# Patient Record
Sex: FEMALE | Race: WHITE | NOT HISPANIC OR LATINO
[De-identification: names, ages, dates, MRNs, and addresses within clinical notes are randomized per-mention and may not be internally consistent; named-entity substitution may affect disease eponyms.]

---

## 2017-01-12 ENCOUNTER — APPOINTMENT (OUTPATIENT)
Dept: INTERNAL MEDICINE | Facility: CLINIC | Age: 82
End: 2017-01-12

## 2017-01-14 ENCOUNTER — MEDICATION RENEWAL (OUTPATIENT)
Age: 82
End: 2017-01-14

## 2017-01-15 ENCOUNTER — RX RENEWAL (OUTPATIENT)
Age: 82
End: 2017-01-15

## 2017-01-18 ENCOUNTER — RX RENEWAL (OUTPATIENT)
Age: 82
End: 2017-01-18

## 2017-02-10 ENCOUNTER — APPOINTMENT (OUTPATIENT)
Dept: CARDIOLOGY | Facility: CLINIC | Age: 82
End: 2017-02-10

## 2017-03-10 ENCOUNTER — APPOINTMENT (OUTPATIENT)
Dept: CARDIOLOGY | Facility: CLINIC | Age: 82
End: 2017-03-10

## 2017-03-21 ENCOUNTER — EMERGENCY (EMERGENCY)
Facility: HOSPITAL | Age: 82
LOS: 1 days | Discharge: HOME | End: 2017-03-21
Attending: EMERGENCY MEDICINE | Admitting: EMERGENCY MEDICINE
Payer: MEDICARE

## 2017-03-21 VITALS
SYSTOLIC BLOOD PRESSURE: 129 MMHG | RESPIRATION RATE: 20 BRPM | HEART RATE: 65 BPM | TEMPERATURE: 97 F | DIASTOLIC BLOOD PRESSURE: 70 MMHG | OXYGEN SATURATION: 97 % | HEIGHT: 66 IN

## 2017-03-21 VITALS
DIASTOLIC BLOOD PRESSURE: 71 MMHG | TEMPERATURE: 98 F | HEART RATE: 60 BPM | RESPIRATION RATE: 16 BRPM | SYSTOLIC BLOOD PRESSURE: 117 MMHG | OXYGEN SATURATION: 95 %

## 2017-03-21 DIAGNOSIS — M79.651 PAIN IN RIGHT THIGH: ICD-10-CM

## 2017-03-21 PROCEDURE — 80053 COMPREHEN METABOLIC PANEL: CPT

## 2017-03-21 PROCEDURE — 93971 EXTREMITY STUDY: CPT | Mod: 26

## 2017-03-21 PROCEDURE — 85610 PROTHROMBIN TIME: CPT

## 2017-03-21 PROCEDURE — 99284 EMERGENCY DEPT VISIT MOD MDM: CPT | Mod: 25

## 2017-03-21 PROCEDURE — 85730 THROMBOPLASTIN TIME PARTIAL: CPT

## 2017-03-21 PROCEDURE — 99284 EMERGENCY DEPT VISIT MOD MDM: CPT | Mod: GC

## 2017-03-21 PROCEDURE — 85027 COMPLETE CBC AUTOMATED: CPT

## 2017-03-21 PROCEDURE — 93971 EXTREMITY STUDY: CPT

## 2017-03-21 NOTE — ED PROVIDER NOTE - ATTENDING CONTRIBUTION TO CARE
Nemes - 89yo F w hx of prior DVT in the ER for pain and erythema to R thigh for 3 days. No fevers/chills/dizziness. N o trauma. No CP/SOB. Induration, erythema and TTP,  tortuous, localized over superficial varicose veins. No warmth. Likely superficial thrombophlebitis, low suspicion for DVT/ cellulitis. Will get labs, DVT study, treat, likely DC w antiinflammatories, warm compresses and elevation

## 2017-03-21 NOTE — ED PROVIDER NOTE - PLAN OF CARE
1) Please follow-up with your Primary Medical Doctor in 3-5 days. If you need to find a new physician, please call [phone].  2) Return to the Emergency Department if you experiences: fevers, chills, worsening rash, swellign, pain, or symptoms that are new or recurrent.  3) If you have any questions or concerns, do not hesitate to contact us at [phone].  4) To alleviate the pain, please rest and take Tylenol 650 mg or Motrin 400 mg once every 6 hours as needed.

## 2017-03-21 NOTE — ED PROVIDER NOTE - OBJECTIVE STATEMENT
RLE mass on the thigh that started 3 days ago, painful to touch, swelling around the vein, started to have redness around the area.  No trauma or falls, bug bites, cuts.   Pt denies any concern for dizziness, and is not dizzy today. Has been dizzy in the past when standing up from standing.     PMD: Herb Cortes

## 2017-03-21 NOTE — ED ADULT NURSE NOTE - PMH
CAD (coronary artery disease)    Depression    DVT (deep venous thrombosis)    High cholesterol    Myocardial infarction  2005

## 2017-03-21 NOTE — ED PROVIDER NOTE - CARE PLAN
Principal Discharge DX:	Thrombophlebitis  Instructions for follow-up, activity and diet:	1) Please follow-up with your Primary Medical Doctor in 3-5 days. If you need to find a new physician, please call [phone].  2) Return to the Emergency Department if you experiences: fevers, chills, worsening rash, swellign, pain, or symptoms that are new or recurrent.  3) If you have any questions or concerns, do not hesitate to contact us at [phone].  4) To alleviate the pain, please rest and take Tylenol 650 mg or Motrin 400 mg once every 6 hours as needed. Principal Discharge DX:	Thrombophlebitis  Instructions for follow-up, activity and diet:	1) Please follow-up with your Primary Medical Doctor in 3-5 days. If you need to find a new physician, please call [phone].  2) Return to the Emergency Department if you experiences: fevers, chills, worsening rash, swellign, pain, or symptoms that are new or recurrent.  3) If you have any questions or concerns, do not hesitate to contact us at [phone].  4) To alleviate the pain, please rest and take Tylenol 650 mg or Motrin 400 mg once every 6 hours as needed.

## 2017-03-21 NOTE — ED PROVIDER NOTE - PHYSICAL EXAMINATION
Physical Exam: elderly F in NAD, AAOx3, NCAT, MMM, CTAB, normal rate and regular rhythm, abdomen is soft and NTND, No edema, No deformity of extremities,  RLE w/ thrombophlebitis with mild erythema of the skin around thrombosis of veins, CN grossly intact, No focal motor or sensory deficits. ~ Saud Noe MD

## 2017-03-21 NOTE — ED ADULT NURSE NOTE - OBJECTIVE STATEMENT
89 year old female patient presents to ED with swollen 89 year old female patient presents to ED with swelling and redness to RLE veins x 3 days. Pt reports worsening pain upon palpation. Pt reports previous hx of DVT to LLE years ago, but states she did not have pain with DVT. Denies chest pain, SOB, abd pain, n/v/d, fevers, chills. Daughter at bedside concerned for dizziness, but patient states shes been having dizzy spells for 20+ years and is not concerned. VSS.

## 2017-03-21 NOTE — ED PROVIDER NOTE - NS ED ROS FT
No fever, no chills, no change in vision, no change in hearing, no chest pain, no shortness of breath, no abdominal pain, no vomiting, no dysuria, + RLE muscle pain, + rashes, no loss of consciousness. ~ Saud Noe MD

## 2017-03-30 ENCOUNTER — RX RENEWAL (OUTPATIENT)
Age: 82
End: 2017-03-30

## 2017-04-17 ENCOUNTER — RX RENEWAL (OUTPATIENT)
Age: 82
End: 2017-04-17

## 2017-07-05 ENCOUNTER — APPOINTMENT (OUTPATIENT)
Dept: CARDIOLOGY | Facility: CLINIC | Age: 82
End: 2017-07-05

## 2017-07-11 ENCOUNTER — RX RENEWAL (OUTPATIENT)
Age: 82
End: 2017-07-11

## 2017-07-21 ENCOUNTER — RX RENEWAL (OUTPATIENT)
Age: 82
End: 2017-07-21

## 2017-07-26 ENCOUNTER — NON-APPOINTMENT (OUTPATIENT)
Age: 82
End: 2017-07-26

## 2017-07-26 ENCOUNTER — APPOINTMENT (OUTPATIENT)
Dept: CARDIOLOGY | Facility: CLINIC | Age: 82
End: 2017-07-26

## 2017-07-26 VITALS
DIASTOLIC BLOOD PRESSURE: 58 MMHG | RESPIRATION RATE: 16 BRPM | BODY MASS INDEX: 25.61 KG/M2 | HEIGHT: 64 IN | OXYGEN SATURATION: 95 % | HEART RATE: 75 BPM | WEIGHT: 150 LBS | SYSTOLIC BLOOD PRESSURE: 96 MMHG

## 2017-07-27 LAB
ALBUMIN SERPL ELPH-MCNC: 4.4 G/DL
ALP BLD-CCNC: 114 U/L
ALT SERPL-CCNC: 23 U/L
ANION GAP SERPL CALC-SCNC: 17 MMOL/L
APPEARANCE: ABNORMAL
AST SERPL-CCNC: 26 U/L
BACTERIA: ABNORMAL
BASOPHILS # BLD AUTO: 0.03 K/UL
BASOPHILS NFR BLD AUTO: 0.4 %
BILIRUB SERPL-MCNC: 1.1 MG/DL
BILIRUBIN URINE: ABNORMAL
BLOOD URINE: NEGATIVE
BUN SERPL-MCNC: 30 MG/DL
CALCIUM SERPL-MCNC: 9.3 MG/DL
CHLORIDE SERPL-SCNC: 104 MMOL/L
CHOLEST SERPL-MCNC: 182 MG/DL
CHOLEST/HDLC SERPL: 3 RATIO
CO2 SERPL-SCNC: 25 MMOL/L
COLOR: ABNORMAL
CREAT SERPL-MCNC: 1.56 MG/DL
EOSINOPHIL # BLD AUTO: 0.23 K/UL
EOSINOPHIL NFR BLD AUTO: 3.2 %
GLUCOSE QUALITATIVE U: NORMAL MG/DL
GLUCOSE SERPL-MCNC: 116 MG/DL
GRANULAR CASTS: 1 /LPF
HCT VFR BLD CALC: 41.7 %
HDLC SERPL-MCNC: 61 MG/DL
HGB BLD-MCNC: 13.4 G/DL
HYALINE CASTS: 2 /LPF
IMM GRANULOCYTES NFR BLD AUTO: 0.1 %
KETONES URINE: ABNORMAL
LDLC SERPL CALC-MCNC: 96 MG/DL
LDLC SERPL DIRECT ASSAY-MCNC: 106 MG/DL
LEUKOCYTE ESTERASE URINE: ABNORMAL
LYMPHOCYTES # BLD AUTO: 1.38 K/UL
LYMPHOCYTES NFR BLD AUTO: 19 %
MAN DIFF?: NORMAL
MCHC RBC-ENTMCNC: 31.1 PG
MCHC RBC-ENTMCNC: 32.1 GM/DL
MCV RBC AUTO: 96.8 FL
MICROSCOPIC-UA: NORMAL
MONOCYTES # BLD AUTO: 0.63 K/UL
MONOCYTES NFR BLD AUTO: 8.7 %
NEUTROPHILS # BLD AUTO: 4.99 K/UL
NEUTROPHILS NFR BLD AUTO: 68.6 %
NITRITE URINE: NEGATIVE
PH URINE: 5
PLATELET # BLD AUTO: 181 K/UL
POTASSIUM SERPL-SCNC: 4.7 MMOL/L
PROT SERPL-MCNC: 7.2 G/DL
PROTEIN URINE: 100 MG/DL
RBC # BLD: 4.31 M/UL
RBC # FLD: 13.9 %
RED BLOOD CELLS URINE: 4 /HPF
SODIUM SERPL-SCNC: 146 MMOL/L
SPECIFIC GRAVITY URINE: 1.02
SQUAMOUS EPITHELIAL CELLS: 10 /HPF
TRIGL SERPL-MCNC: 126 MG/DL
URINE COMMENTS: NORMAL
UROBILINOGEN URINE: 1 MG/DL
WBC # FLD AUTO: 7.27 K/UL
WHITE BLOOD CELLS URINE: 36 /HPF

## 2017-09-15 ENCOUNTER — APPOINTMENT (OUTPATIENT)
Dept: CV DIAGNOSITCS | Facility: HOSPITAL | Age: 82
End: 2017-09-15

## 2017-10-19 ENCOUNTER — RX RENEWAL (OUTPATIENT)
Age: 82
End: 2017-10-19

## 2017-11-02 ENCOUNTER — APPOINTMENT (OUTPATIENT)
Dept: ELECTROPHYSIOLOGY | Facility: CLINIC | Age: 82
End: 2017-11-02

## 2017-11-03 ENCOUNTER — APPOINTMENT (OUTPATIENT)
Dept: CARDIOLOGY | Facility: CLINIC | Age: 82
End: 2017-11-03

## 2017-11-15 ENCOUNTER — APPOINTMENT (OUTPATIENT)
Dept: CARDIOLOGY | Facility: CLINIC | Age: 82
End: 2017-11-15
Payer: MEDICARE

## 2017-11-15 ENCOUNTER — NON-APPOINTMENT (OUTPATIENT)
Age: 82
End: 2017-11-15

## 2017-11-15 VITALS
HEART RATE: 75 BPM | BODY MASS INDEX: 25.58 KG/M2 | DIASTOLIC BLOOD PRESSURE: 68 MMHG | OXYGEN SATURATION: 94 % | WEIGHT: 149 LBS | SYSTOLIC BLOOD PRESSURE: 122 MMHG

## 2017-11-15 DIAGNOSIS — I35.0 NONRHEUMATIC AORTIC (VALVE) STENOSIS: ICD-10-CM

## 2017-11-15 DIAGNOSIS — I50.9 HEART FAILURE, UNSPECIFIED: ICD-10-CM

## 2017-11-15 DIAGNOSIS — E78.5 HYPERLIPIDEMIA, UNSPECIFIED: ICD-10-CM

## 2017-11-15 DIAGNOSIS — I25.10 ATHEROSCLEROTIC HEART DISEASE OF NATIVE CORONARY ARTERY W/OUT ANGINA PECTORIS: ICD-10-CM

## 2017-11-15 DIAGNOSIS — Z86.79 PERSONAL HISTORY OF OTHER DISEASES OF THE CIRCULATORY SYSTEM: ICD-10-CM

## 2017-11-15 PROCEDURE — 90662 IIV NO PRSV INCREASED AG IM: CPT

## 2017-11-15 PROCEDURE — 93000 ELECTROCARDIOGRAM COMPLETE: CPT

## 2017-11-15 PROCEDURE — 93306 TTE W/DOPPLER COMPLETE: CPT

## 2017-11-15 PROCEDURE — G0008: CPT

## 2017-11-15 PROCEDURE — 99215 OFFICE O/P EST HI 40 MIN: CPT

## 2017-12-04 ENCOUNTER — RX RENEWAL (OUTPATIENT)
Age: 82
End: 2017-12-04

## 2017-12-13 ENCOUNTER — APPOINTMENT (OUTPATIENT)
Dept: CARDIOLOGY | Facility: CLINIC | Age: 82
End: 2017-12-13

## 2017-12-28 ENCOUNTER — APPOINTMENT (OUTPATIENT)
Dept: ELECTROPHYSIOLOGY | Facility: CLINIC | Age: 82
End: 2017-12-28

## 2018-01-22 ENCOUNTER — RX RENEWAL (OUTPATIENT)
Age: 83
End: 2018-01-22

## 2018-03-25 ENCOUNTER — RX RENEWAL (OUTPATIENT)
Age: 83
End: 2018-03-25

## 2018-04-10 ENCOUNTER — APPOINTMENT (OUTPATIENT)
Dept: CARDIOLOGY | Facility: CLINIC | Age: 83
End: 2018-04-10

## 2018-05-09 ENCOUNTER — INPATIENT (INPATIENT)
Facility: HOSPITAL | Age: 83
LOS: 11 days | Discharge: HOME | DRG: 175 | End: 2018-05-21
Attending: HOSPITALIST | Admitting: HOSPITALIST
Payer: MEDICARE

## 2018-05-09 VITALS
RESPIRATION RATE: 16 BRPM | OXYGEN SATURATION: 97 % | DIASTOLIC BLOOD PRESSURE: 56 MMHG | SYSTOLIC BLOOD PRESSURE: 97 MMHG | HEART RATE: 74 BPM

## 2018-05-09 DIAGNOSIS — I26.99 OTHER PULMONARY EMBOLISM WITHOUT ACUTE COR PULMONALE: ICD-10-CM

## 2018-05-09 LAB
ALBUMIN SERPL ELPH-MCNC: 3.5 G/DL — SIGNIFICANT CHANGE UP (ref 3.3–5)
ALP SERPL-CCNC: 84 U/L — SIGNIFICANT CHANGE UP (ref 40–120)
ALT FLD-CCNC: 13 U/L — SIGNIFICANT CHANGE UP (ref 10–45)
ANION GAP SERPL CALC-SCNC: 14 MMOL/L — SIGNIFICANT CHANGE UP (ref 5–17)
APPEARANCE UR: ABNORMAL
APTT BLD: 37.7 SEC — HIGH (ref 27.5–37.4)
AST SERPL-CCNC: 16 U/L — SIGNIFICANT CHANGE UP (ref 10–40)
BASE EXCESS BLDV CALC-SCNC: 3.3 MMOL/L — HIGH (ref -2–2)
BASOPHILS # BLD AUTO: 0 K/UL — SIGNIFICANT CHANGE UP (ref 0–0.2)
BASOPHILS NFR BLD AUTO: 0.3 % — SIGNIFICANT CHANGE UP (ref 0–2)
BILIRUB SERPL-MCNC: 2.1 MG/DL — HIGH (ref 0.2–1.2)
BILIRUB UR-MCNC: NEGATIVE — SIGNIFICANT CHANGE UP
BUN SERPL-MCNC: 25 MG/DL — HIGH (ref 7–23)
CA-I SERPL-SCNC: 1.16 MMOL/L — SIGNIFICANT CHANGE UP (ref 1.12–1.3)
CALCIUM SERPL-MCNC: 8.7 MG/DL — SIGNIFICANT CHANGE UP (ref 8.4–10.5)
CHLORIDE BLDV-SCNC: 107 MMOL/L — SIGNIFICANT CHANGE UP (ref 96–108)
CHLORIDE SERPL-SCNC: 102 MMOL/L — SIGNIFICANT CHANGE UP (ref 96–108)
CO2 BLDV-SCNC: 30 MMOL/L — SIGNIFICANT CHANGE UP (ref 22–30)
CO2 SERPL-SCNC: 26 MMOL/L — SIGNIFICANT CHANGE UP (ref 22–31)
COLOR SPEC: ABNORMAL
CREAT SERPL-MCNC: 1.15 MG/DL — SIGNIFICANT CHANGE UP (ref 0.5–1.3)
DIFF PNL FLD: NEGATIVE — SIGNIFICANT CHANGE UP
EOSINOPHIL # BLD AUTO: 0 K/UL — SIGNIFICANT CHANGE UP (ref 0–0.5)
EOSINOPHIL NFR BLD AUTO: 0.2 % — SIGNIFICANT CHANGE UP (ref 0–6)
EPI CELLS # UR: SIGNIFICANT CHANGE UP /HPF
GAS PNL BLDV: 140 MMOL/L — SIGNIFICANT CHANGE UP (ref 136–145)
GAS PNL BLDV: SIGNIFICANT CHANGE UP
GAS PNL BLDV: SIGNIFICANT CHANGE UP
GLUCOSE BLDV-MCNC: 164 MG/DL — HIGH (ref 70–99)
GLUCOSE SERPL-MCNC: 152 MG/DL — HIGH (ref 70–99)
GLUCOSE UR QL: NEGATIVE — SIGNIFICANT CHANGE UP
HCO3 BLDV-SCNC: 28 MMOL/L — SIGNIFICANT CHANGE UP (ref 21–29)
HCT VFR BLD CALC: 40.9 % — SIGNIFICANT CHANGE UP (ref 34.5–45)
HCT VFR BLDA CALC: 40 % — SIGNIFICANT CHANGE UP (ref 39–50)
HGB BLD CALC-MCNC: 13.1 G/DL — SIGNIFICANT CHANGE UP (ref 11.5–15.5)
HGB BLD-MCNC: 13.4 G/DL — SIGNIFICANT CHANGE UP (ref 11.5–15.5)
INR BLD: 1.29 RATIO — HIGH (ref 0.88–1.16)
KETONES UR-MCNC: NEGATIVE — SIGNIFICANT CHANGE UP
LACTATE BLDV-MCNC: 2.4 MMOL/L — HIGH (ref 0.7–2)
LEUKOCYTE ESTERASE UR-ACNC: ABNORMAL
LYMPHOCYTES # BLD AUTO: 0.7 K/UL — LOW (ref 1–3.3)
LYMPHOCYTES # BLD AUTO: 6.9 % — LOW (ref 13–44)
MCHC RBC-ENTMCNC: 31.5 PG — SIGNIFICANT CHANGE UP (ref 27–34)
MCHC RBC-ENTMCNC: 32.8 GM/DL — SIGNIFICANT CHANGE UP (ref 32–36)
MCV RBC AUTO: 96 FL — SIGNIFICANT CHANGE UP (ref 80–100)
MONOCYTES # BLD AUTO: 0.6 K/UL — SIGNIFICANT CHANGE UP (ref 0–0.9)
MONOCYTES NFR BLD AUTO: 6.8 % — SIGNIFICANT CHANGE UP (ref 2–14)
NEUTROPHILS # BLD AUTO: 8.1 K/UL — HIGH (ref 1.8–7.4)
NEUTROPHILS NFR BLD AUTO: 85.8 % — HIGH (ref 43–77)
NITRITE UR-MCNC: NEGATIVE — SIGNIFICANT CHANGE UP
NT-PROBNP SERPL-SCNC: HIGH PG/ML (ref 0–300)
PCO2 BLDV: 47 MMHG — SIGNIFICANT CHANGE UP (ref 35–50)
PH BLDV: 7.4 — SIGNIFICANT CHANGE UP (ref 7.35–7.45)
PH UR: 6 — SIGNIFICANT CHANGE UP (ref 5–8)
PLATELET # BLD AUTO: 123 K/UL — LOW (ref 150–400)
PO2 BLDV: 16 MMHG — LOW (ref 25–45)
POTASSIUM BLDV-SCNC: 4.6 MMOL/L — SIGNIFICANT CHANGE UP (ref 3.5–5)
POTASSIUM SERPL-MCNC: 4.6 MMOL/L — SIGNIFICANT CHANGE UP (ref 3.5–5.3)
POTASSIUM SERPL-SCNC: 4.6 MMOL/L — SIGNIFICANT CHANGE UP (ref 3.5–5.3)
PROT SERPL-MCNC: 7 G/DL — SIGNIFICANT CHANGE UP (ref 6–8.3)
PROT UR-MCNC: 30 MG/DL
PROTHROM AB SERPL-ACNC: 14 SEC — HIGH (ref 9.8–12.7)
RBC # BLD: 4.26 M/UL — SIGNIFICANT CHANGE UP (ref 3.8–5.2)
RBC # FLD: 13.2 % — SIGNIFICANT CHANGE UP (ref 10.3–14.5)
RBC CASTS # UR COMP ASSIST: SIGNIFICANT CHANGE UP /HPF (ref 0–2)
SAO2 % BLDV: 21 % — LOW (ref 67–88)
SODIUM SERPL-SCNC: 142 MMOL/L — SIGNIFICANT CHANGE UP (ref 135–145)
SP GR SPEC: >1.03 — HIGH (ref 1.01–1.02)
TROPONIN T SERPL-MCNC: 0.04 NG/ML — SIGNIFICANT CHANGE UP (ref 0–0.06)
UROBILINOGEN FLD QL: 2 MG/DL
WBC # BLD: 9.5 K/UL — SIGNIFICANT CHANGE UP (ref 3.8–10.5)
WBC # FLD AUTO: 9.5 K/UL — SIGNIFICANT CHANGE UP (ref 3.8–10.5)
WBC UR QL: >50 /HPF (ref 0–5)

## 2018-05-09 PROCEDURE — 93010 ELECTROCARDIOGRAM REPORT: CPT

## 2018-05-09 PROCEDURE — 99291 CRITICAL CARE FIRST HOUR: CPT | Mod: GC

## 2018-05-09 PROCEDURE — 71045 X-RAY EXAM CHEST 1 VIEW: CPT | Mod: 26

## 2018-05-09 PROCEDURE — 71275 CT ANGIOGRAPHY CHEST: CPT | Mod: 26

## 2018-05-09 PROCEDURE — 99223 1ST HOSP IP/OBS HIGH 75: CPT | Mod: GC

## 2018-05-09 RX ORDER — HEPARIN SODIUM 5000 [USP'U]/ML
INJECTION INTRAVENOUS; SUBCUTANEOUS
Qty: 25000 | Refills: 0 | Status: DISCONTINUED | OUTPATIENT
Start: 2018-05-09 | End: 2018-05-18

## 2018-05-09 RX ORDER — SODIUM CHLORIDE 9 MG/ML
1000 INJECTION INTRAMUSCULAR; INTRAVENOUS; SUBCUTANEOUS
Qty: 0 | Refills: 0 | Status: DISCONTINUED | OUTPATIENT
Start: 2018-05-09 | End: 2018-05-10

## 2018-05-09 RX ORDER — HEPARIN SODIUM 5000 [USP'U]/ML
5000 INJECTION INTRAVENOUS; SUBCUTANEOUS ONCE
Qty: 0 | Refills: 0 | Status: COMPLETED | OUTPATIENT
Start: 2018-05-09 | End: 2018-05-09

## 2018-05-09 RX ORDER — HEPARIN SODIUM 5000 [USP'U]/ML
2500 INJECTION INTRAVENOUS; SUBCUTANEOUS EVERY 6 HOURS
Qty: 0 | Refills: 0 | Status: DISCONTINUED | OUTPATIENT
Start: 2018-05-09 | End: 2018-05-18

## 2018-05-09 RX ORDER — HEPARIN SODIUM 5000 [USP'U]/ML
5000 INJECTION INTRAVENOUS; SUBCUTANEOUS EVERY 6 HOURS
Qty: 0 | Refills: 0 | Status: DISCONTINUED | OUTPATIENT
Start: 2018-05-09 | End: 2018-05-18

## 2018-05-09 RX ORDER — CEFTRIAXONE 500 MG/1
1 INJECTION, POWDER, FOR SOLUTION INTRAMUSCULAR; INTRAVENOUS ONCE
Qty: 0 | Refills: 0 | Status: COMPLETED | OUTPATIENT
Start: 2018-05-09 | End: 2018-05-10

## 2018-05-09 RX ADMIN — HEPARIN SODIUM 1100 UNIT(S)/HR: 5000 INJECTION INTRAVENOUS; SUBCUTANEOUS at 23:04

## 2018-05-09 RX ADMIN — HEPARIN SODIUM 5000 UNIT(S): 5000 INJECTION INTRAVENOUS; SUBCUTANEOUS at 23:03

## 2018-05-09 RX ADMIN — SODIUM CHLORIDE 100 MILLILITER(S): 9 INJECTION INTRAMUSCULAR; INTRAVENOUS; SUBCUTANEOUS at 20:36

## 2018-05-09 NOTE — ED ADULT NURSE NOTE - OBJECTIVE STATEMENT
89 y.o F presents to the ED via EMS for difficulty breathing. Patient is awake, alert and speaking coherently. Patient states that recently she has been having episodes of difficulty breathing and SOB. Additionally patient reports decreased PO intake over the past week. As per patient's son, he reports that the patient has some dementia and she has been non-compliant with her medications- he was unaware until this week. Patient presents A&Ox3, afebrile and ambulatory with assistance (weak); denies headache and dizziness, denies chest pain, denies cough and SOB at this time -RR 18 and SPO2 95% on RA. Cardiac monitoring initiated- paced rhythm noted on the monitor. 89 y.o F presents to the ED via EMS for difficulty breathing. Patient is awake, alert and speaking coherently. Patient states that recently she has been having episodes of difficulty breathing and SOB. Additionally patient reports decreased PO intake over the past week. As per patient's son, he reports that the patient has some dementia and she has been non-compliant with her medications- he was unaware until this week. Patient presents A&Ox3, afebrile and ambulatory with assistance (weak); denies headache and dizziness, denies chest pain, denies cough and SOB at this time -RR 18 and SPO2 95% on RA, lungs clear. Cardiac monitoring initiated- paced rhythm noted on the monitor.

## 2018-05-09 NOTE — ED PROVIDER NOTE - ATTENDING CONTRIBUTION TO CARE
89 yof pmhx cad w stent in 2005, re-cathed few yrs ago with no instent stenosis or other lesions found. at that time pt also was vol overloaded requiring diuresis. also has hx of complete heart block w pacer placed. hx of prior dvt on coumadin which has now finished. presents today with gradual onset shortness of breath, generalized weakness, sleeping more, eating less, feeling depressed. this afternoon had episode of left sided substernal/ below left breast chest pain and was feeling "more shaky" than usual. no increased leg swelling, has been staying in bed more than usual recently.   cards - [facility].     ROS:   constitutional - no fever, no chills, + weakness   eyes - no visual changes, no redness  eent - no sore throat, no nasal congestion  cvs - + chest pain, no leg swelling  resp - no shortness of breath, no cough  gi - no abdominal pain, no vomiting, no diarrhea  gu - no dysuria, no hematuria  msk - no acute back pain, no joint swelling  skin - no rashes, no jaundice  neuro - no headache, no focal weakness  psych - no acute mental health issue     Physical Exam:   constitutional - well appearing, awake and alert, oriented x3, satting low 90s on ra, bp mildly soft.   head - no external evidence of trauma  cvs - rrr, no murmurs, no peripheral edema, no calf tenderness  resp - breath sounds clear and equal bilat  gi - abdomen soft and nontender, no rigidity, guarding or rebound, bowel sounds present  msk - moving all extremities spontaneously  neuro - alert and oriented x3, no focal deficits, CNs 2-12 grossly intact  skin- no jaundice, warm and dry  psych - mood and affect wnl, no apparent risk to self or others     ? acs in setting of known cad w stents, vs pe (mild hypoxia, prior dvt off ac) vs chf exac (sob, mildy hypoxic though her lungs sound clear, does not appear clinically volume overloaded at this time). will do blood work, plan for hospital admission for further w/u. SARIAH Brown MD 89 yof pmhx cad w stent in 2005, re-cathed few yrs ago with no instent stenosis or other lesions found. at that time pt also was vol overloaded requiring diuresis. also has hx of complete heart block w pacer placed. hx of prior dvt on coumadin which has now finished. presents today with gradual onset shortness of breath, generalized weakness, sleeping more, eating less, feeling depressed. this afternoon had episode of left sided substernal/ below left breast chest pain and was feeling "more shaky" than usual. no increased leg swelling, has been staying in bed more than usual recently.   [facility].     ROS:   constitutional - no fever, no chills, + weakness   eyes - no visual changes, no redness  eent - no sore throat, no nasal congestion  cvs - + chest pain, no leg swelling  resp - no shortness of breath, no cough  gi - no abdominal pain, no vomiting, no diarrhea  gu - no dysuria, no hematuria  msk - no acute back pain, no joint swelling  skin - no rashes, no jaundice  neuro - no headache, no focal weakness  psych - no acute mental health issue     Physical Exam:   constitutional - well appearing, awake and alert, oriented x3, satting low 90s on ra, bp mildly soft.   head - no external evidence of trauma  cvs - rrr, no murmurs, no peripheral edema, no calf tenderness  resp - breath sounds clear and equal bilat  gi - abdomen soft and nontender, no rigidity, guarding or rebound, bowel sounds present  msk - moving all extremities spontaneously  neuro - alert and oriented x3, no focal deficits, CNs 2-12 grossly intact  skin- no jaundice, warm and dry  psych - mood and affect wnl, no apparent risk to self or others     ? acs in setting of known cad w stents, vs pe (mild hypoxia, prior dvt off ac) vs chf exac (sob, mildy hypoxic though her lungs sound clear, does not appear clinically volume overloaded at this time). will do blood work, plan for hospital admission for further w/u. pt found to have markedly elev pro bnp c/w poss chf exac - cta chest w few segmental pe's without signs of rv strain however pt w mild hypotension and elev bnp - pert team called who did not feel pt candidate for interventional therapies; started heparin gtt, bedside echo done by cards w/o signif rv strain. will admit for further eval.  SARIAH Brown MD

## 2018-05-09 NOTE — ED PROVIDER NOTE - MEDICAL DECISION MAKING DETAILS
Saud Noe MD (resident): HI and L lower chest pain. will get EKG, CXR, cardiac work-up. pt w/ mild hypoxia to 95% but no acute resp distress.

## 2018-05-09 NOTE — ED PROVIDER NOTE - PHYSICAL EXAMINATION
Physical Exam: elderly F who is in NAD, AAOx3, NCAT, MMM, neck is supple, PERRL, CTAB without rales or ronchi, normal rate and regular rhythm, abdomen is soft and NTND, No edema, No deformity of extremities, No rashes, CN grossly intact, No focal motor or sensory deficits. ~ Saud Noe MD

## 2018-05-09 NOTE — ED PROVIDER NOTE - NS ED ROS FT
No fever, no chills, no change in vision, no change in hearing, + chest pain, + shortness of breath, no abdominal pain, no vomiting, no dysuria, no muscle pain, no rashes, no loss of consciousness. ~ Saud Noe MD

## 2018-05-09 NOTE — ED PROVIDER NOTE - CRITICAL CARE PROVIDED
interpretation of diagnostic studies/consultation with other physicians/direct patient care (not related to procedure)/documentation/additional history taking direct patient care (not related to procedure)/documentation/interpretation of diagnostic studies/consult w/ pt's family directly relating to pts condition/consultation with other physicians/additional history taking

## 2018-05-09 NOTE — ED PROVIDER NOTE - PROGRESS NOTE DETAILS
Saud Noe MD (resident): cards fellow will come to assess pt for PERT. discussed w/ Dr. Porras, accepted to tele.

## 2018-05-09 NOTE — ED PROVIDER NOTE - OBJECTIVE STATEMENT
Saud Noe MD (resident): 89 F w/ Hx of dementia, CAD, MI, DVT, who was BIBEMS for SOB. Pt has no complaints and does not know why she is in the hospital. However, family reports that pt has been complaining of SOB and has been noncompliant with her medications. EMS noted SpO2 to 94%. Saud Noe MD (resident): 89 F w/ Hx of dementia, CAD, MI, DVT no longer on A/C, who was BIBEMS for SOB. Family reports that pt has been complaining of SOB and has been noncompliant with her medications. EMS noted SpO2 to 94%. Patient reports SOB that is worse w/ exertion over the last 2 days, cannot walk up the stairs of her home any longer. Pt also with intermittent L lower chest pain that is sharp in nature, nonpleuritic, nonexertional. Pt lives alone, no home aides. Pt on  mg, lasix 40 mg.

## 2018-05-09 NOTE — ED ADULT NURSE NOTE - CHPI ED SYMPTOMS NEG
no headache/no hemoptysis/no wheezing/no diaphoresis/no body aches/no chest pain/no cough/no chills/no edema/no fever

## 2018-05-09 NOTE — ED PROVIDER NOTE - CARE PLAN
Principal Discharge DX:	Other acute pulmonary embolism without acute cor pulmonale  Secondary Diagnosis:	Elevated troponin Principal Discharge DX:	Other acute pulmonary embolism without acute cor pulmonale  Secondary Diagnosis:	UTI (urinary tract infection)  Secondary Diagnosis:	Chronic systolic heart failure

## 2018-05-10 DIAGNOSIS — I50.22 CHRONIC SYSTOLIC (CONGESTIVE) HEART FAILURE: ICD-10-CM

## 2018-05-10 DIAGNOSIS — R91.8 OTHER NONSPECIFIC ABNORMAL FINDING OF LUNG FIELD: ICD-10-CM

## 2018-05-10 DIAGNOSIS — I25.10 ATHEROSCLEROTIC HEART DISEASE OF NATIVE CORONARY ARTERY WITHOUT ANGINA PECTORIS: ICD-10-CM

## 2018-05-10 DIAGNOSIS — Z29.9 ENCOUNTER FOR PROPHYLACTIC MEASURES, UNSPECIFIED: ICD-10-CM

## 2018-05-10 DIAGNOSIS — N18.3 CHRONIC KIDNEY DISEASE, STAGE 3 (MODERATE): ICD-10-CM

## 2018-05-10 DIAGNOSIS — D69.6 THROMBOCYTOPENIA, UNSPECIFIED: ICD-10-CM

## 2018-05-10 DIAGNOSIS — I26.99 OTHER PULMONARY EMBOLISM WITHOUT ACUTE COR PULMONALE: ICD-10-CM

## 2018-05-10 LAB
ALBUMIN SERPL ELPH-MCNC: 3.8 G/DL — SIGNIFICANT CHANGE UP (ref 3.3–5)
ALP SERPL-CCNC: 87 U/L — SIGNIFICANT CHANGE UP (ref 40–120)
ALT FLD-CCNC: 13 U/L — SIGNIFICANT CHANGE UP (ref 10–45)
ANION GAP SERPL CALC-SCNC: 13 MMOL/L — SIGNIFICANT CHANGE UP (ref 5–17)
APTT BLD: 66.4 SEC — HIGH (ref 27.5–37.4)
APTT BLD: 71.5 SEC — HIGH (ref 27.5–37.4)
AST SERPL-CCNC: 15 U/L — SIGNIFICANT CHANGE UP (ref 10–40)
BASOPHILS # BLD AUTO: 0 K/UL — SIGNIFICANT CHANGE UP (ref 0–0.2)
BASOPHILS NFR BLD AUTO: 0.3 % — SIGNIFICANT CHANGE UP (ref 0–2)
BILIRUB SERPL-MCNC: 1.8 MG/DL — HIGH (ref 0.2–1.2)
BUN SERPL-MCNC: 25 MG/DL — HIGH (ref 7–23)
CALCIUM SERPL-MCNC: 8.9 MG/DL — SIGNIFICANT CHANGE UP (ref 8.4–10.5)
CHLORIDE SERPL-SCNC: 102 MMOL/L — SIGNIFICANT CHANGE UP (ref 96–108)
CK MB CFR SERPL CALC: 3 NG/ML — SIGNIFICANT CHANGE UP (ref 0–3.8)
CK SERPL-CCNC: 83 U/L — SIGNIFICANT CHANGE UP (ref 25–170)
CO2 SERPL-SCNC: 26 MMOL/L — SIGNIFICANT CHANGE UP (ref 22–31)
CREAT SERPL-MCNC: 1.16 MG/DL — SIGNIFICANT CHANGE UP (ref 0.5–1.3)
CULTURE RESULTS: SIGNIFICANT CHANGE UP
EOSINOPHIL # BLD AUTO: 0.1 K/UL — SIGNIFICANT CHANGE UP (ref 0–0.5)
EOSINOPHIL NFR BLD AUTO: 1.2 % — SIGNIFICANT CHANGE UP (ref 0–6)
GLUCOSE SERPL-MCNC: 121 MG/DL — HIGH (ref 70–99)
HCT VFR BLD CALC: 38.7 % — SIGNIFICANT CHANGE UP (ref 34.5–45)
HGB BLD-MCNC: 12.8 G/DL — SIGNIFICANT CHANGE UP (ref 11.5–15.5)
INR BLD: 1.33 RATIO — HIGH (ref 0.88–1.16)
LACTATE SERPL-SCNC: 1.1 MMOL/L — SIGNIFICANT CHANGE UP (ref 0.7–2)
LYMPHOCYTES # BLD AUTO: 0.9 K/UL — LOW (ref 1–3.3)
LYMPHOCYTES # BLD AUTO: 8.2 % — LOW (ref 13–44)
MCHC RBC-ENTMCNC: 31.9 PG — SIGNIFICANT CHANGE UP (ref 27–34)
MCHC RBC-ENTMCNC: 33.1 GM/DL — SIGNIFICANT CHANGE UP (ref 32–36)
MCV RBC AUTO: 96.4 FL — SIGNIFICANT CHANGE UP (ref 80–100)
MONOCYTES # BLD AUTO: 0.7 K/UL — SIGNIFICANT CHANGE UP (ref 0–0.9)
MONOCYTES NFR BLD AUTO: 6.7 % — SIGNIFICANT CHANGE UP (ref 2–14)
NEUTROPHILS # BLD AUTO: 9.2 K/UL — HIGH (ref 1.8–7.4)
NEUTROPHILS NFR BLD AUTO: 83.6 % — HIGH (ref 43–77)
PLATELET # BLD AUTO: 114 K/UL — LOW (ref 150–400)
POTASSIUM SERPL-MCNC: 3.9 MMOL/L — SIGNIFICANT CHANGE UP (ref 3.5–5.3)
POTASSIUM SERPL-SCNC: 3.9 MMOL/L — SIGNIFICANT CHANGE UP (ref 3.5–5.3)
PROCALCITONIN SERPL-MCNC: 0.16 NG/ML — HIGH (ref 0–0.04)
PROT SERPL-MCNC: 7.1 G/DL — SIGNIFICANT CHANGE UP (ref 6–8.3)
PROTHROM AB SERPL-ACNC: 14.5 SEC — HIGH (ref 9.8–12.7)
RAPID RVP RESULT: SIGNIFICANT CHANGE UP
RBC # BLD: 4.01 M/UL — SIGNIFICANT CHANGE UP (ref 3.8–5.2)
RBC # FLD: 13.1 % — SIGNIFICANT CHANGE UP (ref 10.3–14.5)
SODIUM SERPL-SCNC: 141 MMOL/L — SIGNIFICANT CHANGE UP (ref 135–145)
SPECIMEN SOURCE: SIGNIFICANT CHANGE UP
TROPONIN T SERPL-MCNC: 0.04 NG/ML — SIGNIFICANT CHANGE UP (ref 0–0.06)
TSH SERPL-MCNC: 0.67 UIU/ML — SIGNIFICANT CHANGE UP (ref 0.27–4.2)
WBC # BLD: 11 K/UL — HIGH (ref 3.8–10.5)
WBC # FLD AUTO: 11 K/UL — HIGH (ref 3.8–10.5)

## 2018-05-10 PROCEDURE — 93010 ELECTROCARDIOGRAM REPORT: CPT

## 2018-05-10 PROCEDURE — 93306 TTE W/DOPPLER COMPLETE: CPT | Mod: 26

## 2018-05-10 PROCEDURE — 99233 SBSQ HOSP IP/OBS HIGH 50: CPT | Mod: GC

## 2018-05-10 PROCEDURE — 93970 EXTREMITY STUDY: CPT | Mod: 26

## 2018-05-10 RX ORDER — SERTRALINE 25 MG/1
2 TABLET, FILM COATED ORAL
Qty: 0 | Refills: 0 | COMMUNITY

## 2018-05-10 RX ORDER — ACETAMINOPHEN 500 MG
1000 TABLET ORAL ONCE
Qty: 0 | Refills: 0 | Status: COMPLETED | OUTPATIENT
Start: 2018-05-10 | End: 2018-05-10

## 2018-05-10 RX ORDER — CARVEDILOL PHOSPHATE 80 MG/1
1 CAPSULE, EXTENDED RELEASE ORAL
Qty: 0 | Refills: 0 | COMMUNITY

## 2018-05-10 RX ORDER — SERTRALINE 25 MG/1
200 TABLET, FILM COATED ORAL DAILY
Qty: 0 | Refills: 0 | Status: DISCONTINUED | OUTPATIENT
Start: 2018-05-10 | End: 2018-05-12

## 2018-05-10 RX ORDER — EZETIMIBE 10 MG/1
1 TABLET ORAL
Qty: 0 | Refills: 0 | COMMUNITY

## 2018-05-10 RX ORDER — CEFTRIAXONE 500 MG/1
1 INJECTION, POWDER, FOR SOLUTION INTRAMUSCULAR; INTRAVENOUS EVERY 24 HOURS
Qty: 0 | Refills: 0 | Status: DISCONTINUED | OUTPATIENT
Start: 2018-05-10 | End: 2018-05-11

## 2018-05-10 RX ORDER — ACETAMINOPHEN 500 MG
650 TABLET ORAL ONCE
Qty: 0 | Refills: 0 | Status: COMPLETED | OUTPATIENT
Start: 2018-05-10 | End: 2018-05-10

## 2018-05-10 RX ORDER — ACETAMINOPHEN 500 MG
650 TABLET ORAL EVERY 6 HOURS
Qty: 0 | Refills: 0 | Status: DISCONTINUED | OUTPATIENT
Start: 2018-05-10 | End: 2018-05-21

## 2018-05-10 RX ORDER — ATORVASTATIN CALCIUM 80 MG/1
80 TABLET, FILM COATED ORAL AT BEDTIME
Qty: 0 | Refills: 0 | Status: DISCONTINUED | OUTPATIENT
Start: 2018-05-10 | End: 2018-05-21

## 2018-05-10 RX ORDER — ATORVASTATIN CALCIUM 80 MG/1
1 TABLET, FILM COATED ORAL
Qty: 0 | Refills: 0 | COMMUNITY

## 2018-05-10 RX ORDER — SERTRALINE 25 MG/1
1 TABLET, FILM COATED ORAL
Qty: 0 | Refills: 0 | COMMUNITY

## 2018-05-10 RX ORDER — CARVEDILOL PHOSPHATE 80 MG/1
3.12 CAPSULE, EXTENDED RELEASE ORAL EVERY 12 HOURS
Qty: 0 | Refills: 0 | Status: DISCONTINUED | OUTPATIENT
Start: 2018-05-10 | End: 2018-05-10

## 2018-05-10 RX ORDER — ASPIRIN/CALCIUM CARB/MAGNESIUM 324 MG
81 TABLET ORAL DAILY
Qty: 0 | Refills: 0 | Status: DISCONTINUED | OUTPATIENT
Start: 2018-05-10 | End: 2018-05-21

## 2018-05-10 RX ADMIN — CEFTRIAXONE 100 GRAM(S): 500 INJECTION, POWDER, FOR SOLUTION INTRAMUSCULAR; INTRAVENOUS at 12:00

## 2018-05-10 RX ADMIN — HEPARIN SODIUM 1100 UNIT(S)/HR: 5000 INJECTION INTRAVENOUS; SUBCUTANEOUS at 05:38

## 2018-05-10 RX ADMIN — CEFTRIAXONE 100 GRAM(S): 500 INJECTION, POWDER, FOR SOLUTION INTRAMUSCULAR; INTRAVENOUS at 02:42

## 2018-05-10 RX ADMIN — Medication 81 MILLIGRAM(S): at 12:00

## 2018-05-10 RX ADMIN — Medication 400 MILLIGRAM(S): at 18:51

## 2018-05-10 RX ADMIN — Medication 650 MILLIGRAM(S): at 05:00

## 2018-05-10 RX ADMIN — SERTRALINE 200 MILLIGRAM(S): 25 TABLET, FILM COATED ORAL at 12:00

## 2018-05-10 RX ADMIN — Medication 650 MILLIGRAM(S): at 04:30

## 2018-05-10 RX ADMIN — ATORVASTATIN CALCIUM 80 MILLIGRAM(S): 80 TABLET, FILM COATED ORAL at 22:25

## 2018-05-10 RX ADMIN — Medication 1000 MILLIGRAM(S): at 19:20

## 2018-05-10 NOTE — H&P ADULT - NSHPREVIEWOFSYSTEMS_GEN_ALL_CORE
REVIEW OF SYSTEMS:  CONSTITUTIONAL: No weakness, fevers or chills  EYES/ENT: No visual changes;  No vertigo or throat pain   NECK: No pain or stiffness  RESPIRATORY: No cough, wheezing, hemoptysis; No shortness of breath  CARDIOVASCULAR: No chest pain or palpitations  GASTROINTESTINAL: No abdominal or epigastric pain. No nausea, vomiting, or hematemesis; No diarrhea or constipation. No melena or hematochezia.  GENITOURINARY: No dysuria, frequency or hematuria  NEUROLOGICAL: No numbness or weakness  SKIN: No itching, burning, rashes, or lesions   All other review of systems is negative unless indicated above. REVIEW OF SYSTEMS:  CONSTITUTIONAL: +weakness. No fevers or chills  EYES/ENT: No visual changes;  No vertigo or throat pain   NECK: No pain or stiffness  RESPIRATORY: +shortness of breath. No cough, wheezing, hemoptysis  CARDIOVASCULAR: No chest pain or palpitations  GASTROINTESTINAL: No abdominal or epigastric pain. No nausea, vomiting, or hematemesis; No diarrhea or constipation. No melena or hematochezia.  GENITOURINARY: No dysuria, frequency or hematuria  NEUROLOGICAL: No numbness or weakness  SKIN: No itching, burning, rashes, or lesions   All other review of systems is negative unless indicated above.

## 2018-05-10 NOTE — H&P ADULT - PROBLEM SELECTOR PLAN 2
-bilateral opacities noted on CT concerning for Pulmonary Edema, PNA, vs motion artifact  -no signs/symptoms of PNA but patient is empirically covered with CTX  -no evidence of overt fluid overload but BNP elevated in the setting acute PE  -check Procalcitonin, trend CE  -consider bedside US to assess for consolidation or B-lines

## 2018-05-10 NOTE — H&P ADULT - PROBLEM SELECTOR PLAN 4
-will transition ASA 325mg to 81mg now that patient is being anticoagulated  -trend CE, EKG w/o ischemic changes, Tele monitoring  -will hold Coreg 3.125mg BID for now in the setting of relative hypotension, restart as tolerated

## 2018-05-10 NOTE — PHYSICAL THERAPY INITIAL EVALUATION ADULT - PLANNED THERAPY INTERVENTIONS, PT EVAL
gait training/Stairs; GOAL: Pt will safely negotiate 12 steps independently in two weeks./transfer training

## 2018-05-10 NOTE — CONSULT NOTE ADULT - ASSESSMENT
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 CTA performed by ED showed segmental PE within the right sided pulmonary arteries. PERT called for a submassive PE despite lack of RV strain on TTE due to Trop 0.04 and elevated BNP in a patient with known HFrEF. The patient is hemodynamically stable, PESI Score 104 (Intermediate risk).     Plan:    1. Submassive, Intermediate Risk Segmental PE  -c/w Heparin gtt for now, will need oral anticoagulant  -check b/l LE Dopplers  -Limited TTE performed by me in ED - read pending - nl RV function on my interpretation  -Serial Jana  -Monitor on Tele  -Need to determine details of prior VTE - is this her second unprovoked VTE? Why was Coumadin stopped previously    Vascular Cardiology will follow    CARMEN Feliz  Cardiology Fellow  (p): [phone]

## 2018-05-10 NOTE — PHYSICAL THERAPY INITIAL EVALUATION ADULT - PERTINENT HX OF CURRENT PROBLEM, REHAB EVAL
Pt is a 89 yo F admitted to [facility] 5/9/18 presenting with L-sided chest discomfort and associated SOB. Patient reports L-sided "tingling" discomfort, worse with palpation, difficult to say if worse with inspiration. She has had breathing issues for several days as well.

## 2018-05-10 NOTE — PHYSICAL THERAPY INITIAL EVALUATION ADULT - ADDITIONAL COMMENTS
Pt lives alone in house with 3 steps to enter and 12 steps inside with R sided handrail. PTA Pt reports being independent with functional mobility and ADL.

## 2018-05-10 NOTE — H&P ADULT - HISTORY OF PRESENT ILLNESS
89yo F with PMH of HFrEF (EF ~45%), CAD/MI (2005, s/p PCI), Severe AS, h/o DVT (2014, no longer on Coumadin), s/p PPM ([employer]), and HLD presenting with L-sided chest discomfort and associated SOB. Patient reports L-sided "tingling" discomfort, worse with palpation, difficult to say if worse with inspiration. She has had breathing issues for several days as well. She denies fever, chills, Abd Pain, N/V/D, leg swelling, worsening orthopnea. Patient she has become very sedentary in her daily routine, only getting up to eat and use the bathroom. No flights or long car rides.     In the ED, patient was afebrile, with BP 90s-100s, satting 95-97% on RA. She was given CTX x1, CTA Chest, Cards consult, and started on Heparin gtt.

## 2018-05-10 NOTE — CONSULT NOTE ADULT - SUBJECTIVE AND OBJECTIVE BOX
Patient seen and evaluated at bedside in ED [facility] with family at bedside    Outpatient Cardiologist: Dr. Amadou Harrison / EP: Dr. Matias Carreno    Chief Complaint: "I had a funny feeling under L breath" x 2d    HPI:      PMHx:   Depression  High cholesterol  DVT (deep venous thrombosis)  Myocardial infarction  CAD (coronary artery disease)      PSHx:   S/P hemorrhoidectomy      Allergies:  latex (Blisters)  lisinopril (Angioedema)  penicillins (Unknown)  shellfish (Unknown)  sulfa drugs (Unknown)      Home Meds:    Current Medications:   cefTRIAXone   IVPB 1 Gram(s) IV Intermittent once  heparin  Infusion.  Unit(s)/Hr IV Continuous <Continuous>  heparin  Injectable 5000 Unit(s) IV Push every 6 hours PRN  heparin  Injectable 2500 Unit(s) IV Push every 6 hours PRN  sodium chloride 0.9%. 1000 milliLiter(s) IV Continuous <Continuous>      FAMILY HISTORY:  No pertinent family history in first degree relatives      Social History:  Smoking History:  Alcohol Use:  Drug Use:    Review of Systems:  REVIEW OF SYSTEMS:    CONSTITUTIONAL: No weakness, fevers or chills  EYES/ENT: No visual changes;  No dysphagia  NECK: No pain or stiffness  RESPIRATORY: No cough, wheezing, hemoptysis; No shortness of breath  CARDIOVASCULAR: No chest pain or palpitations; No lower extremity edema  GASTROINTESTINAL: No abdominal or epigastric pain. No nausea, vomiting, or hematemesis; No diarrhea or constipation. No melena or hematochezia.  BACK: No back pain  GENITOURINARY: No dysuria, frequency or hematuria  NEUROLOGICAL: No numbness or weakness  SKIN: No itching, burning, rashes, or lesions   All other review of systems is negative unless indicated above.    Physical Exam:  T(F): 98 (05-09), Max: 98 (05-09)  HR: 83 (05-10) (74 - 85)  BP: 105/64 (05-10) (97/56 - 105/64)  RR: 18 (05-10)  SpO2: 97% (05-10)  GENERAL: No acute distress, well-developed  HEAD:  Atraumatic, Normocephalic  ENT: EOMI, PERRLA, conjunctiva and sclera clear, Neck supple, No JVD, moist mucosa  CHEST/LUNG: Clear to auscultation bilaterally; No wheeze, equal breath sounds bilaterally   BACK: No spinal tenderness  HEART: Regular rate and rhythm; No murmurs, rubs, or gallops  ABDOMEN: Soft, Nontender, Nondistended; Bowel sounds present  EXTREMITIES:  No clubbing, cyanosis, or edema  PSYCH: Nl behavior, nl affect  NEUROLOGY: AAOx3, non-focal, cranial nerves intact  SKIN: Normal color, No rashes or lesions  LINES:    Cardiovascular Diagnostic Testing:    ECG: Personally reviewed:    Echo: Personally reviewed:    Stress Testing:    Cath:    Imaging:    CXR: Personally reviewed    Labs: Personally reviewed                        13.4   9.5   )-----------( 123      ( 09 May 2018 20:30 )             40.9     05-09    142  |  102  |  25<H>  ----------------------------<  152<H>  4.6   |  26  |  1.15    Ca    8.7      09 May 2018 20:30  Phos  3.6     05-09  Mg     2.2     05-09    TPro  7.0  /  Alb  3.5  /  TBili  2.1<H>  /  DBili  x   /  AST  16  /  ALT  13  /  AlkPhos  84  05-09    PT/INR - ( 09 May 2018 20:30 )   PT: 14.0 sec;   INR: 1.29 ratio         PTT - ( 09 May 2018 20:30 )  PTT:37.7 sec  CARDIAC MARKERS ( 09 May 2018 20:30 )  x     / 0.04 ng/mL / x     / x     / x          Serum Pro-Brain Natriuretic Peptide: 47373 pg/mL (05-09 @ 20:30) Patient seen and evaluated at bedside in ED [facility] with family at bedside    Outpatient Cardiologist: Dr. Amadou Harrison / EP: Dr. Matias Carreno    Chief Complaint: "I had a funny feeling under L breath" x 2d    HPI: 89F w/ PMHx of CAD (s/p PCI 2005), chronic HFrEF (EF:45% from 2015), severe AS, DVT (Dx in 1/2014, unclear details, no longer on Coumadin), HLD, High degree AVB (s/p [employer]PM) who presented c/o a funny feeling on her L chest wall under her L breast a/w SOB x 2d. She denied CP but reported chest discomfort. Something didn't feel right with her breathing. The patient is ambulatory and is not currently smoking. She and the family do not remember any inciting events prior to her DVT in 2014. They deny any bleeding episodes to explain why Coumadin was stopped. They do not know the duration of time the patient was treated w/ Coumadin for. She also reports decreased p.o. intake x 1m. The patient's family stated that she had been noncompliant with her medications lately. Of note - the patient's BP at baseline (per my chart review of [employer]) are 100s - 120s/50s - 70s    ED Course: CXR, NS @ 100cc/hr, CTA, Heparin gtt, CTX, Admit to Tele    PMHx:   Depression  HLD  DVT (Dx 1/2014) - unknown details  CAD (s/p PCI 2005)  severe AS  chronic HFrEF (EF:45% from 2015)    PSHx:   S/P hemorrhoidectomy  s/p [employer] PPM by Dr. Carreno    Allergies:  latex (Blisters)  lisinopril (Angioedema)  penicillins (Unknown)  shellfish (Unknown)  sulfa drugs (Unknown)    Home Meds: , Lipitor 80, Coreg 3.125 bid, Lasix 40 daily, Setraline 1000, Zetia - Note: the patient and family did not know the names of any of her home meds - this list is per [employer] - she last saw Dr. Harrisno in 11/2017    Current Medications:   cefTRIAXone   IVPB 1 Gram(s) IV Intermittent once  heparin  Infusion.  Unit(s)/Hr IV Continuous <Continuous>  heparin  Injectable 5000 Unit(s) IV Push every 6 hours PRN  heparin  Injectable 2500 Unit(s) IV Push every 6 hours PRN  sodium chloride 0.9%. 1000 milliLiter(s) IV Continuous <Continuous>    FAMILY HISTORY: Her sister had, "heart disease" at the age of 70    Social History: Lives alone, her family lives close, no HHA, does not shop for herself  Smoking History: prior smoker, quit at age 60  Alcohol Use: social  Drug Use: denied      REVIEW OF SYSTEMS:  CONSTITUTIONAL: No weakness, fevers or chills, + decreased p.o. intake  EYES/ENT: No visual changes;  No dysphagia  NECK: No pain or stiffness  RESPIRATORY: No cough, wheezing, hemoptysis; + shortness of breath  CARDIOVASCULAR: + chest pain (discomfort), no palpitations; No lower extremity edema  GASTROINTESTINAL: No abdominal or epigastric pain. No nausea, vomiting, or hematemesis; No diarrhea or constipation. No melena or hematochezia.  BACK: No back pain  GENITOURINARY: No dysuria, frequency or hematuria  NEUROLOGICAL: No numbness or weakness  SKIN: No itching, burning, rashes, or lesions   All other review of systems is negative unless indicated above.    Physical Exam:  T(F): 98 (05-09), Max: 98 (05-09)  HR: 83 (05-10) (74 - 85) - HR was 83 on my exam  BP: 105/64 (05-10) (97/56 - 105/64) - BP was 103/61 (MAP:75) on my exam  RR: 18 (05-10)  SpO2: 97% on RA    GENERAL: No acute distress, well-developed, no accessory muscle use, speaking in full sentences  HEAD:  Atraumatic, Normocephalic  ENT: EOMI, PERRLA, conjunctiva and sclera clear, Neck supple, No JVD, moist mucosa, + [place]  CHEST/LUNG: Clear to auscultation bilaterally; No wheeze, equal breath sounds bilaterally   BACK: No spinal tenderness  HEART: Regular rate and rhythm; 2/6 systolic murmur LUSB, soft and single S2, no rubs, or gallops  ABDOMEN: Soft, Nontender, Nondistended; Bowel sounds present  EXTREMITIES:  No clubbing, cyanosis, or edema  PSYCH: Nl behavior, nl affect  NEUROLOGY: AAOx3, non-focal, cranial nerves intact  SKIN: L shin w/ small lesions (pt reports she was itching the skin)  LINES: PIV    Cardiovascular Diagnostic Testing:    ECG: Personally reviewed: sinus w/ 1st degree AVB and V pacing @ 80    Echo: Personally reviewed: < from: TTE with Doppler (w/Cont) (04.20.15 @ 11:50) >  ------------------------------------------------------------------------  Dimensions:    Normal Values:  LA:     5.0    2.0 - 4.0 cm  Ao:     2.8    2.0 - 3.8 cm  SEPTUM: 0.8    0.6 - 1.2 cm  PWT:    1.0    0.6 - 1.1 cm  LVIDd:  5.2    3.0 - 5.6 cm  LVIDs:  3.7    1.8 - 4.0 cm  Derived variables:  LVMI: 95 g/m2  RWT: 0.38  Doppler Peak Velocity (m/sec): AoV=4.7  ------------------------------------------------------------------------  Observations: Mitral Valve: Mitral annular calcification, otherwise normal mitral valve. Mild-moderate mitral regurgitation. Aortic Valve/Aorta: Aortic valve is not well visualized; appears to be a calcified trileaflet valve with decreased opening. Peak transaortic valve gradient equals 88 mm Hg, mean transaortic valve gradient equals 51 mm Hg, estimated aortic valve area equals 0.9 sqcm (by continuity equation), consistent with severe aortic stenosis. Minimal aortic regurgitation. Normal aortic root. Left Atrium: Moderately dilated left atrium.  LA volume index = 38 cc/m2. Left Ventricle: Endocardial visualization enhanced with intravenous injection of echo contrast (Definity). Mild to moderate segmental left ventricular systolic dysfunction. EF approximately 45%. The inferior wall is hypokinetic. Mild left ventricular enlargement. Right Heart: Normal right atrium. Normal right ventricular size and function. Normal tricuspid valve. Minimal tricuspid regurgitation. Pulmonic valve not well visualized, probably normal. Minimal pulmonic regurgitation. Pericardium/Pleura: Normal pericardium with no pericardial effusion. Hemodynamic: Estimated right atrial pressure is 8 mm Hg.  < end of copied text >    Imaging:    CXR: Personally reviewed: < from: Xray Chest 1 View- PORTABLE-Urgent (05.09.18 @ 22:17) > INTERPRETATION:  Hazy bilateral perihilar opacities, as seen on prior  studies, which may represent mild pulmonary edema.  < end of copied text >    < from: CT Angio Chest w/ IV Cont (05.09.18 @ 22:08) > IMPRESSION: Heavily motion degraded study.  Pulmonary emboli within segmental branches of the right sided pulmonary  arteries.  Cardiomegaly without CT evidence for right heart strain, however this should be excluded on the basis of echocardiography.  Small bilateral pleural effusions.  Bilateral groundglass opacities, most prominent in the left upper lobe,  possibly motion related. Mild pulmonary edema or infection is in the  differential.  < end of copied text >   Labs: Personally reviewed                        13.4   9.5   )-----------( 123      ( 09 May 2018 20:30 )             40.9     05-09    142  |  102  |  25<H>  ----------------------------<  152<H>  4.6   |  26  |  1.15 - CrCl:29    Ca    8.7      09 May 2018 20:30  Phos  3.6     05-09  Mg     2.2     05-09    TPro  7.0  /  Alb  3.5  /  TBili  2.1<H>  /  DBili  x   /  AST  16  /  ALT  13  /  AlkPhos  84  05-09    PT/INR - ( 09 May 2018 20:30 )   PT: 14.0 sec;   INR: 1.29 ratio    PTT - ( 09 May 2018 20:30 )  PTT:37.7 sec    CARDIAC MARKERS ( 09 May 2018 20:30 )  x     / 0.04 ng/mL / x     / x     / x        Serum Pro-Brain Natriuretic Peptide: 17403 pg/mL (05-09 @ 20:30) - last BNP from 2015 was elevated to the same degree

## 2018-05-10 NOTE — H&P ADULT - ASSESSMENT
89yo F with PMH of HFrEF (EF ~45%), CAD/MI (2005, s/p PCI), Severe AS, h/o DVT (2014, no longer on Coumadin), s/p PPM ([employer]), and HLD p/w CP/SOB found to have CT evidence of submassive R-sided PE w/ elevated BNP.

## 2018-05-10 NOTE — H&P ADULT - PROBLEM SELECTOR PLAN 1
-CTA with acute R-sided PE, no evidence of RHS on CT and per Cards bedside TTE  -Heparin gtt for AC, transition to oral agent for discharge  -seemingly unprovoked (?sedentary lifestyle), raises concern for underlying malignancy (no colonoscopy ever)  -TTE, Tele, trend CE  -appreciate Cards recs

## 2018-05-10 NOTE — PHYSICAL THERAPY INITIAL EVALUATION ADULT - GENERAL OBSERVATIONS, REHAB EVAL
Pt received supine in bed, +heparin, +tele Pt received supine in bed, +heparin, +tele, +O2 2.5L via NC

## 2018-05-10 NOTE — PROGRESS NOTE ADULT - ASSESSMENT
89yo F with PMH of HFrEF (EF ~45%), CAD/MI (2005, s/p PCI), Severe AS, h/o DVT (2014, no longer on Coumadin), s/p PPM ([employer]), and HLD p/w CP/SOB found to have CT evidence of submassive R-sided PE w/ elevated BNP, now on hep gtt improving.

## 2018-05-10 NOTE — H&P ADULT - NSHPLABSRESULTS_GEN_ALL_CORE
LABS: Personally Read and Interpreted                        13.4   9.5   )-----------( 123      ( 09 May 2018 20:30 )             40.9   Hgb Trend: 13.4<--    PT/INR - ( 09 May 2018 20:30 )   PT: 14.0 sec;   INR: 1.29 ratio    PTT - ( 09 May 2018 20:30 )  PTT:37.7 sec    142  |  102  |  25<H>  ----------------------------<  152<H>  4.6   |  26  |  1.15    Ca    8.7      09 May 2018 20:30  Phos  3.6     05-09  Mg     2.2     05-09    TPro  7.0  /  Alb  3.5  /  TBili  2.1<H>  /  DBili  x   /  AST  16  /  ALT  13  /  AlkPhos  84  05-09  Creatinine Trend: 1.15<--    CARDIAC MARKERS ( 09 May 2018 20:30 )  x     / 0.04 ng/mL / x     / x     / x      BNP 80584    Lactate 2.4  TSH 0.67    Urinalysis Basic - ( 09 May 2018 22:35 )  Color: Radha / Appearance: SL Turbid / SG: >1.030 / pH: x  Gluc: x / Ketone: Negative  / Bili: Negative / Urobili: 2 mg/dL   Blood: x / Protein: 30 mg/dL / Nitrite: Negative   Leuk Esterase: Large / RBC: 0-2 /HPF / WBC >50 /HPF   Sq Epi: x / Non Sq Epi: Few /HPF / Bacteria: x    RVP negative    EKG: tracing personally read and reviewed;    IMAGING  CXR: personally read and reviewed; bilateral pulmonary infiltrates  CTA CHEST: Pulmonary emboli within segmental branches of the right sided pulmonary arteries. Cardiomegaly without CT evidence for right heart strain. Small bilateral pleural effusions. Bilateral groundglass opacities, most prominent in the left upper lobe, possibly motion related. LABS: Personally Read and Interpreted                        13.4   9.5   )-----------( 123      ( 09 May 2018 20:30 )             40.9   Hgb Trend: 13.4<--    PT/INR - ( 09 May 2018 20:30 )   PT: 14.0 sec;   INR: 1.29 ratio    PTT - ( 09 May 2018 20:30 )  PTT:37.7 sec    142  |  102  |  25<H>  ----------------------------<  152<H>  4.6   |  26  |  1.15    Ca    8.7      09 May 2018 20:30  Phos  3.6     05-09  Mg     2.2     05-09    TPro  7.0  /  Alb  3.5  /  TBili  2.1<H>  /  DBili  x   /  AST  16  /  ALT  13  /  AlkPhos  84  05-09  Creatinine Trend: 1.15<--    CARDIAC MARKERS ( 09 May 2018 20:30 )  x     / 0.04 ng/mL / x     / x     / x      BNP 15791    Lactate 2.4  TSH 0.67    Urinalysis Basic - ( 09 May 2018 22:35 )  Color: Radha / Appearance: SL Turbid / SG: >1.030 / pH: x  Gluc: x / Ketone: Negative  / Bili: Negative / Urobili: 2 mg/dL   Blood: x / Protein: 30 mg/dL / Nitrite: Negative   Leuk Esterase: Large / RBC: 0-2 /HPF / WBC >50 /HPF   Sq Epi: x / Non Sq Epi: Few /HPF / Bacteria: x    RVP negative    EKG: tracing personally read and reviewed; Sinus w/ 1st degree block, V-paced    IMAGING  CXR: personally read and reviewed; bilateral pulmonary infiltrates  CTA CHEST: Pulmonary emboli within segmental branches of the right sided pulmonary arteries. Cardiomegaly without CT evidence for right heart strain. Small bilateral pleural effusions. Bilateral groundglass opacities, most prominent in the left upper lobe, possibly motion related.

## 2018-05-10 NOTE — PROGRESS NOTE ADULT - SUBJECTIVE AND OBJECTIVE BOX
Rachael Ashford MD, PhD  PGY-1| Internal Medicine  [phone] / [id number]  Team 1  =======================    Patient is a 89y old  Female who presents with a chief complaint of Pulmonary Embolism (10 May 2018 02:29)    Interval History: Pt examined at bedside this am.  admitted overnight.    Briefly, pt was admitted after PE was found on CTA.  Pt presented because she was having left sided chest discomfort assocaited with SOB, increasing over the last week.  Pt states she had been getting more sedentary in the last week, not going out and feeling "less like herself".  When pt presented, she was significantly hypoxic, requiring O2.  This am, pt stated she was not sob, feeling better, chest pain resolving.  Denies n/v/d/f/c.    On tele, pt is v-paced 70s, no events noted.    MEDICATIONS  (STANDING):  aspirin enteric coated 81 milliGRAM(s) Oral daily  atorvastatin 80 milliGRAM(s) Oral at bedtime  heparin  Infusion.  Unit(s)/Hr (11 mL/Hr) IV Continuous <Continuous>  sertraline 200 milliGRAM(s) Oral daily    MEDICATIONS  (PRN):  acetaminophen   Tablet. 650 milliGRAM(s) Oral every 6 hours PRN Mild and Moderate Pain (1 - 6)  heparin  Injectable 5000 Unit(s) IV Push every 6 hours PRN For aPTT less than 40  heparin  Injectable 2500 Unit(s) IV Push every 6 hours PRN For aPTT between 40 - 57      Objective    Vital Signs Last 24 Hrs  T(C): 36.8 (10 May 2018 04:06), Max: 36.9 (10 May 2018 02:15)  T(F): 98.3 (10 May 2018 04:06), Max: 98.4 (10 May 2018 02:15)  HR: 90 (10 May 2018 04:06) (74 - 91)  BP: 113/72 (10 May 2018 04:06) (97/56 - 117/65)  BP(mean): 72 (10 May 2018 02:29) (72 - 72)  RR: 24 (10 May 2018 04:06) (16 - 24)  SpO2: 97% (10 May 2018 04:06) (95% - 97%)      CAPILLARY BLOOD GLUCOSE      Appearance: Sitting in bed, NAD; very talkative.  HEENT:   Normal oral mucosa, PERRL, EOMI, anicteric sclera  Lymphatic: No lymphadenopathy noted  Cardiovascular: RRR, systolic murmur, no gallops or rubs appreciated  Respiratory: Lungs clear to auscultation bilaterally, no wheezes, crackles appreciated  Gastrointestinal:  Soft, nondistended, nontender, +BS	  Skin: No rashes, eccymosis or cyanosis noted	  Neurologic: AOx3, CNII-XII grossly intact, motor and sensory function grossly intact  Extremities: Moving all extremities equally; no edema  Vascular: palpable dp, pt and radial pulses 2+ bilaterally  Psych:  Normal mood and affect, responds to questions appropriately      05-10    141  |  102  |  25<H>  ----------------------------<  121<H>  3.9   |  26  |  1.16  05-09    142  |  102  |  25<H>  ----------------------------<  152<H>  4.6   |  26  |  1.15    Ca    8.9      10 May 2018 05:06  Ca    8.7      09 May 2018 20:30  Phos  3.6     05-09  Mg     2.2     05-09    TPro  7.1  /  Alb  3.8  /  TBili  1.8<H>  /  DBili  x   /  AST  15  /  ALT  13  /  AlkPhos  87  05-10  TPro  7.0  /  Alb  3.5  /  TBili  2.1<H>  /  DBili  x   /  AST  16  /  ALT  13  /  AlkPhos  84  05-09      PT/INR - ( 10 May 2018 05:06 )   PT: 14.5 sec;   INR: 1.33 ratio         PTT - ( 10 May 2018 05:06 )  PTT:71.5 sec              Urinalysis Basic - ( 09 May 2018 22:35 )    Color: Radha / Appearance: SL Turbid / SG: >1.030 / pH: x  Gluc: x / Ketone: Negative  / Bili: Negative / Urobili: 2 mg/dL   Blood: x / Protein: 30 mg/dL / Nitrite: Negative   Leuk Esterase: Large / RBC: 0-2 /HPF / WBC >50 /HPF   Sq Epi: x / Non Sq Epi: Few /HPF / Bacteria: x                              12.8   11.0  )-----------( 114      ( 10 May 2018 05:06 )             38.7                         13.4   9.5   )-----------( 123      ( 09 May 2018 20:30 )             40.9     CAPILLARY BLOOD GLUCOSE        Blood Gas Source Venous: Venous (05-09 @ 20:30)        Radiology & Imaging    Imaging Personally Reviewed:    Consultant Notes Reviewed

## 2018-05-10 NOTE — H&P ADULT - PROBLEM SELECTOR PLAN 5
-no acute exacerbation despite elevated BNP though no significantly elevated from past  -hold Lasix 40mg for now given relative hypotension, caution with further fluids  -TTE, Tele  -appreciate Cards recs

## 2018-05-10 NOTE — PHYSICAL THERAPY INITIAL EVALUATION ADULT - PRECAUTIONS/LIMITATIONS, REHAB EVAL
She denies fever, chills, Abd Pain, N/V/D, leg swelling, worsening orthopnea. Patient she has become very sedentary in her daily routine, only getting up to eat and use the bathroom. No flights or long car rides. Pt PMH of HFrEF (EF ~45%), CAD/MI (2005, s/p PCI), Severe AS, h/o DVT (2014, no longer on Coumadin), s/p PPM ([employer]), and HLD.

## 2018-05-10 NOTE — H&P ADULT - ATTENDING COMMENTS
In brief , pt is a 89 F w/PMH of HFrEF (EF ~45%), CAD/MI (2005, s/p PCI), Severe AS, h/o DVT , s/p PPM ([employer]), and HLD p/w CP/SOB ,  CT w/ evidence of submassive R-sided PE w/ elevated BNP, ? pulmonary edema , hypotensive , started on heparin gtt , evaluated by PERT no evidence of RHS on bedside echo by cardiology, also given ceftriaxone for possible UTI. can obtain procalcitonin to r/o pna and f/u cultures. will monitor closely on heparin and transition to PO regimen .

## 2018-05-10 NOTE — PHYSICAL THERAPY INITIAL EVALUATION ADULT - DISCHARGE DISPOSITION, PT EVAL
DC Subacute rehab for strengthening, ambulation, stair negotiation, falls risk, safety, and functional mobility. If Pt were to go home, recommending rolling walker. JENNIFER Fernando aware./rehabilitation facility

## 2018-05-10 NOTE — PHYSICAL THERAPY INITIAL EVALUATION ADULT - GAIT TRAINING, PT EVAL
GOAL: Pt will ambulate 300' independently without SOB and with appropriate assistive device in two weeks.

## 2018-05-10 NOTE — PHYSICAL THERAPY INITIAL EVALUATION ADULT - ACTIVE RANGE OF MOTION EXAMINATION, REHAB EVAL
isabell. upper extremity Active ROM was WNL (within normal limits)/bilateral lower extremity Active ROM was WNL (within normal limits)

## 2018-05-10 NOTE — PROGRESS NOTE ADULT - PROBLEM SELECTOR PLAN 1
-CTA with acute R-sided PE, no evidence of RHS on CT and per Cards bedside TTE  -Heparin gtt for AC, transition to oral agent for discharge  - CE neg x 2  -appreciate Cards recs

## 2018-05-10 NOTE — H&P ADULT - PROBLEM SELECTOR PLAN 3
-asymptomatic, UA w/ LE and WBC but no nitrites  -s/p CTX x1, continue for now  -f/u Urine CX and cyrus-escalate Abx as appropriate

## 2018-05-11 ENCOUNTER — TRANSCRIPTION ENCOUNTER (OUTPATIENT)
Age: 83
End: 2018-05-11

## 2018-05-11 DIAGNOSIS — N39.0 URINARY TRACT INFECTION, SITE NOT SPECIFIED: ICD-10-CM

## 2018-05-11 DIAGNOSIS — F32.9 MAJOR DEPRESSIVE DISORDER, SINGLE EPISODE, UNSPECIFIED: ICD-10-CM

## 2018-05-11 DIAGNOSIS — F33.41 MAJOR DEPRESSIVE DISORDER, RECURRENT, IN PARTIAL REMISSION: ICD-10-CM

## 2018-05-11 LAB
ANION GAP SERPL CALC-SCNC: 15 MMOL/L — SIGNIFICANT CHANGE UP (ref 5–17)
APTT BLD: 55.8 SEC — HIGH (ref 27.5–37.4)
APTT BLD: 63.2 SEC — HIGH (ref 27.5–37.4)
BASOPHILS # BLD AUTO: 0.02 K/UL — SIGNIFICANT CHANGE UP (ref 0–0.2)
BASOPHILS NFR BLD AUTO: 0.3 % — SIGNIFICANT CHANGE UP (ref 0–2)
BUN SERPL-MCNC: 33 MG/DL — HIGH (ref 7–23)
CALCIUM SERPL-MCNC: 8.5 MG/DL — SIGNIFICANT CHANGE UP (ref 8.4–10.5)
CHLORIDE SERPL-SCNC: 103 MMOL/L — SIGNIFICANT CHANGE UP (ref 96–108)
CO2 SERPL-SCNC: 22 MMOL/L — SIGNIFICANT CHANGE UP (ref 22–31)
CREAT SERPL-MCNC: 1.18 MG/DL — SIGNIFICANT CHANGE UP (ref 0.5–1.3)
EOSINOPHIL # BLD AUTO: 0.11 K/UL — SIGNIFICANT CHANGE UP (ref 0–0.5)
EOSINOPHIL NFR BLD AUTO: 1.6 % — SIGNIFICANT CHANGE UP (ref 0–6)
GLUCOSE SERPL-MCNC: 109 MG/DL — HIGH (ref 70–99)
HCT VFR BLD CALC: 34.5 % — SIGNIFICANT CHANGE UP (ref 34.5–45)
HGB BLD-MCNC: 11.4 G/DL — LOW (ref 11.5–15.5)
IMM GRANULOCYTES NFR BLD AUTO: 0.1 % — SIGNIFICANT CHANGE UP (ref 0–1.5)
LYMPHOCYTES # BLD AUTO: 0.73 K/UL — LOW (ref 1–3.3)
LYMPHOCYTES # BLD AUTO: 10.8 % — LOW (ref 13–44)
MAGNESIUM SERPL-MCNC: 2.3 MG/DL — SIGNIFICANT CHANGE UP (ref 1.6–2.6)
MCHC RBC-ENTMCNC: 31.2 PG — SIGNIFICANT CHANGE UP (ref 27–34)
MCHC RBC-ENTMCNC: 33 GM/DL — SIGNIFICANT CHANGE UP (ref 32–36)
MCV RBC AUTO: 94.5 FL — SIGNIFICANT CHANGE UP (ref 80–100)
MONOCYTES # BLD AUTO: 0.55 K/UL — SIGNIFICANT CHANGE UP (ref 0–0.9)
MONOCYTES NFR BLD AUTO: 8.2 % — SIGNIFICANT CHANGE UP (ref 2–14)
NEUTROPHILS # BLD AUTO: 5.31 K/UL — SIGNIFICANT CHANGE UP (ref 1.8–7.4)
NEUTROPHILS NFR BLD AUTO: 79 % — HIGH (ref 43–77)
PHOSPHATE SERPL-MCNC: 4.2 MG/DL — SIGNIFICANT CHANGE UP (ref 2.5–4.5)
PLATELET # BLD AUTO: 115 K/UL — LOW (ref 150–400)
POTASSIUM SERPL-MCNC: 4.2 MMOL/L — SIGNIFICANT CHANGE UP (ref 3.5–5.3)
POTASSIUM SERPL-SCNC: 4.2 MMOL/L — SIGNIFICANT CHANGE UP (ref 3.5–5.3)
RBC # BLD: 3.65 M/UL — LOW (ref 3.8–5.2)
RBC # FLD: 14.2 % — SIGNIFICANT CHANGE UP (ref 10.3–14.5)
SODIUM SERPL-SCNC: 140 MMOL/L — SIGNIFICANT CHANGE UP (ref 135–145)
WBC # BLD: 6.73 K/UL — SIGNIFICANT CHANGE UP (ref 3.8–10.5)
WBC # FLD AUTO: 6.73 K/UL — SIGNIFICANT CHANGE UP (ref 3.8–10.5)

## 2018-05-11 PROCEDURE — 71045 X-RAY EXAM CHEST 1 VIEW: CPT | Mod: 26

## 2018-05-11 PROCEDURE — 99233 SBSQ HOSP IP/OBS HIGH 50: CPT | Mod: GC

## 2018-05-11 PROCEDURE — 99223 1ST HOSP IP/OBS HIGH 75: CPT

## 2018-05-11 RX ORDER — SERTRALINE 25 MG/1
200 TABLET, FILM COATED ORAL DAILY
Qty: 0 | Refills: 0 | Status: DISCONTINUED | OUTPATIENT
Start: 2018-05-11 | End: 2018-05-21

## 2018-05-11 RX ORDER — CARVEDILOL PHOSPHATE 80 MG/1
3.12 CAPSULE, EXTENDED RELEASE ORAL EVERY 12 HOURS
Qty: 0 | Refills: 0 | Status: DISCONTINUED | OUTPATIENT
Start: 2018-05-11 | End: 2018-05-21

## 2018-05-11 RX ADMIN — ATORVASTATIN CALCIUM 80 MILLIGRAM(S): 80 TABLET, FILM COATED ORAL at 21:05

## 2018-05-11 RX ADMIN — HEPARIN SODIUM 1200 UNIT(S)/HR: 5000 INJECTION INTRAVENOUS; SUBCUTANEOUS at 11:53

## 2018-05-11 RX ADMIN — HEPARIN SODIUM 1200 UNIT(S)/HR: 5000 INJECTION INTRAVENOUS; SUBCUTANEOUS at 19:25

## 2018-05-11 RX ADMIN — SERTRALINE 200 MILLIGRAM(S): 25 TABLET, FILM COATED ORAL at 11:53

## 2018-05-11 RX ADMIN — Medication 81 MILLIGRAM(S): at 11:53

## 2018-05-11 RX ADMIN — CEFTRIAXONE 100 GRAM(S): 500 INJECTION, POWDER, FOR SOLUTION INTRAMUSCULAR; INTRAVENOUS at 12:00

## 2018-05-11 NOTE — BEHAVIORAL HEALTH ASSESSMENT NOTE - SUMMARY
Pt is 89 ys CF, with hx of PE, depression on Zoloft 200 mg currently, she presented with no loss of interest, was watching TV, and reading a book, she denied any sleep disturbance, no suicidal ideation or plans. She denied feeling hopeless, she is future oriented. She denied any Manic sx, no psychosis. She presented sometimes with abrupt answers, however she continued to be engaging I interview. She does not seem to be delirious, nor demented , however she refused to allow this MD to contact her family to verify information. 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    recommendation  1) try to obtain collaterals.  2) continue Zoloft 200 mg pt reported she has been stable on it  3) Despite she denied any denied any SI or plans, she used sarcasm continuously about death, discussed with Dr. West to place pt on enhanced supervision

## 2018-05-11 NOTE — PROGRESS NOTE ADULT - ATTENDING COMMENTS
Spoke to the RN earlier today and reported to be agitated intermittently. She appeared conformable.   continue heparin gtt , monitor CBC and PTT.  TTE noted with EF 35-40% essentially unchanged.   vascular cardiology note appreciated.   chronic systolic HF- appears euvolemic- can resume Lasix in next 24 hours and consider CXR.  psych eval

## 2018-05-11 NOTE — PROGRESS NOTE ADULT - PROBLEM SELECTOR PLAN 4
Pt with history of depression, now on sertraline  - will consult psych today to ensure no manefestations of depression in agitation.

## 2018-05-11 NOTE — DISCHARGE NOTE ADULT - PLAN OF CARE
Ensure resolution. You had a clot in your lungs on presentation.  You were given heparin to treat this while in the hospital and were switched to Eliquis 5 mg twice per day.  You need to continue to take Eliquis 5 mg twice per day.  Please follow up with Dr. Worley on discharge.  Call [phone] for an appointment. Ensure resolution You have depression.  You take sertraline 200 mg daily for this.  Please continue to take this when you go home. Ensure no exacerbations. You have a history of congestive heart failure.  You had some evidence of fluid in your lungs indicative of an exacerbation while in the hospital.  You were given Lasix through your IV while in the hospital.  Please continue your home dosage of Lasix 40 mg per day on discharge.  Continue to take Atorvastatin, Carvedilol to help protect your heart. Ensure no infections You had a urinary tract infection on presentation which was treated with 3 days of antibiotics.  Since then, you had no evidence of infection. You had a clot in your lungs on presentation.  You were given heparin to treat this while in the hospital and were switched to Eliquis 5 mg twice per day.  You need to continue to take Eliquis 5 mg twice per day.  Please follow up with Dr. Lynch on discharge.  Call [phone] for an appointment.

## 2018-05-11 NOTE — PROGRESS NOTE ADULT - SUBJECTIVE AND OBJECTIVE BOX
[facility] [id number]           OFFICE [phone]                              ********VASCULAR MEDICINE & CARDIOLOGY PROGRESS NOTE********                            CC:  PE    INTERVAL HISTORY: Tolerating heparin gtt        HISTORY OF PRESENT ILLNESS:  HPI:  89yo F with PMH of HFrEF (EF ~45%), CAD/MI (2005, s/p PCI), Severe AS, h/o DVT (2014, no longer on Coumadin), s/p PPM ([employer]), and HLD presenting with L-sided chest discomfort and associated SOB. Patient reports L-sided "tingling" discomfort, worse with palpation, difficult to say if worse with inspiration. She has had breathing issues for several days as well. She denies fever, chills, Abd Pain, N/V/D, leg swelling, worsening orthopnea. Patient she has become very sedentary in her daily routine, only getting up to eat and use the bathroom. No flights or long car rides.     In the ED, patient was afebrile, with BP 90s-100s, satting 95-97% on RA. She was given CTX x1, CTA Chest, Cards consult, and started on Heparin gtt. (10 May 2018 02:29)          Allergies    latex (Blisters)  lisinopril (Angioedema)  penicillins (Unknown)  shellfish (Unknown)  sulfa drugs (Unknown)    Intolerances    	    MEDICATIONS:  aspirin enteric coated 81 milliGRAM(s) Oral daily  heparin  Infusion.  Unit(s)/Hr IV Continuous <Continuous>  heparin  Injectable 5000 Unit(s) IV Push every 6 hours PRN  heparin  Injectable 2500 Unit(s) IV Push every 6 hours PRN    cefTRIAXone   IVPB 1 Gram(s) IV Intermittent every 24 hours      acetaminophen   Tablet. 650 milliGRAM(s) Oral every 6 hours PRN  sertraline 200 milliGRAM(s) Oral daily      atorvastatin 80 milliGRAM(s) Oral at bedtime        PAST MEDICAL & SURGICAL HISTORY:  Depression  High cholesterol  DVT (deep venous thrombosis)  Myocardial infarction: 2005  CAD (coronary artery disease)  S/P hemorrhoidectomy      FAMILY HISTORY:  No pertinent family history in first degree relatives      SOCIAL HISTORY:  unchanged    REVIEW OF SYSTEMS:  CONSTITUTIONAL: No fever, weight loss, or fatigue  EYES: No eye pain, visual disturbances, or discharge  ENMT:  No difficulty hearing, tinnitus, vertigo; No sinus or throat pain  NECK: No pain or stiffness  RESPIRATORY: No cough, wheezing, chills or hemoptysis; No Shortness of Breath  CARDIOVASCULAR: No chest pain, palpitations, passing out, dizziness, or leg swelling  GASTROINTESTINAL: No abdominal or epigastric pain. No nausea, vomiting, or hematemesis; No diarrhea or constipation. No melena or hematochezia.  GENITOURINARY: No dysuria, frequency, hematuria, or incontinence  NEUROLOGICAL: No headaches, memory loss, loss of strength, numbness, or tremors  SKIN: No itching, burning, rashes, or lesions   LYMPH Nodes: No enlarged glands  ENDOCRINE: No heat or cold intolerance; No hair loss  MUSCULOSKELETAL: No joint pain or swelling; No muscle, back, or extremity pain  PSYCHIATRIC: No depression, anxiety, mood swings, or difficulty sleeping  HEME/LYMPH: No easy bruising, or bleeding gums  ALLERY AND IMMUNOLOGIC: No hives or eczema	      PHYSICAL EXAM:  T(C): 36.9 (05-11-18 @ 04:07), Max: 36.9 (05-10-18 @ 13:45)  HR: 84 (05-11-18 @ 04:07) (80 - 84)  BP: 114/72 (05-11-18 @ 04:07) (102/64 - 114/72)  RR: 22 (05-11-18 @ 04:07) (18 - 22)  SpO2: 95% (05-11-18 @ 04:07) (95% - 96%)  Wt(kg): --  I&O's Summary    10 May 2018 07:01  -  11 May 2018 07:00  --------------------------------------------------------  IN: 1180 mL / OUT: 250 mL / NET: 930 mL    11 May 2018 07:01  -  11 May 2018 10:44  --------------------------------------------------------  IN: 0 mL / OUT: 0 mL / NET: 0 mL        Appearance: Normal	  HEENT:   Normal oral mucosa, PERRL, EOMI	  Lymphatic: No lymphadenopathy  Cardiovascular: Normal S1 S2, No JVD, No murmurs, No edema  Respiratory: Lungs clear to auscultation	  Psychiatry: A & O x 3, Mood & affect appropriate  Gastrointestinal:  Soft, Non-tender, + BS	  Skin: No rashes, No ecchymoses, No cyanosis	  Neurologic: Non-focal  Extremities: Normal range of motion, No clubbing, cyanosis or edema  Vascular: Peripheral pulses palpable 2+ bilaterally      LABS:	 	    CBC Full  -  ( 11 May 2018 07:56 )  WBC Count : 6.73 K/uL  Hemoglobin : 11.4 g/dL  Hematocrit : 34.5 %  Platelet Count - Automated : 115 K/uL  Mean Cell Volume : 94.5 fl  Mean Cell Hemoglobin : 31.2 pg  Mean Cell Hemoglobin Concentration : 33.0 gm/dL  Auto Neutrophil # : 5.31 K/uL  Auto Lymphocyte # : 0.73 K/uL  Auto Monocyte # : 0.55 K/uL  Auto Eosinophil # : 0.11 K/uL  Auto Basophil # : 0.02 K/uL  Auto Neutrophil % : 79.0 %  Auto Lymphocyte % : 10.8 %  Auto Monocyte % : 8.2 %  Auto Eosinophil % : 1.6 %  Auto Basophil % : 0.3 %    05-11    140  |  103  |  33<H>  ----------------------------<  109<H>  4.2   |  22  |  1.18  05-10    141  |  102  |  25<H>  ----------------------------<  121<H>  3.9   |  26  |  1.16    Ca    8.5      11 May 2018 06:14  Ca    8.9      10 May 2018 05:06  Phos  4.2     05-11  Phos  3.6     05-09  Mg     2.3     05-11  Mg     2.2     05-09    TPro  7.1  /  Alb  3.8  /  TBili  1.8<H>  /  DBili  x   /  AST  15  /  ALT  13  /  AlkPhos  87  05-10  TPro  7.0  /  Alb  3.5  /  TBili  2.1<H>  /  DBili  x   /  AST  16  /  ALT  13  /  AlkPhos  84  05-09

## 2018-05-11 NOTE — DISCHARGE NOTE ADULT - CARE PLAN
Principal Discharge DX:	Other acute pulmonary embolism without acute cor pulmonale  Goal:	Ensure resolution.  Assessment and plan of treatment:	You had a clot in your lungs on presentation.  You were given heparin to treat this while in the hospital and were switched to Eliquis 5 mg twice per day.  You need to continue to take Eliquis 5 mg twice per day.  Please follow up with Dr. Worley on discharge.  Call [phone] for an appointment.  Secondary Diagnosis:	MDD (major depressive disorder), recurrent, in partial remission  Goal:	Ensure resolution  Assessment and plan of treatment:	You have depression.  You take sertraline 200 mg daily for this.  Please continue to take this when you go home.  Secondary Diagnosis:	Acute on chronic systolic congestive heart failure  Goal:	Ensure no exacerbations.  Assessment and plan of treatment:	You have a history of congestive heart failure.  You had some evidence of fluid in your lungs indicative of an exacerbation while in the hospital.  You were given Lasix through your IV while in the hospital.  Please continue your home dosage of Lasix 40 mg per day on discharge.  Continue to take Atorvastatin, Carvedilol to help protect your heart.  Secondary Diagnosis:	Urinary tract infection without hematuria, site unspecified  Goal:	Ensure no infections  Assessment and plan of treatment:	You had a urinary tract infection on presentation which was treated with 3 days of antibiotics.  Since then, you had no evidence of infection. Principal Discharge DX:	Other acute pulmonary embolism without acute cor pulmonale  Goal:	Ensure resolution.  Assessment and plan of treatment:	You had a clot in your lungs on presentation.  You were given heparin to treat this while in the hospital and were switched to Eliquis 5 mg twice per day.  You need to continue to take Eliquis 5 mg twice per day.  Please follow up with Dr. Lynch on discharge.  Call [phone] for an appointment.  Secondary Diagnosis:	MDD (major depressive disorder), recurrent, in partial remission  Goal:	Ensure resolution  Assessment and plan of treatment:	You have depression.  You take sertraline 200 mg daily for this.  Please continue to take this when you go home.  Secondary Diagnosis:	Acute on chronic systolic congestive heart failure  Goal:	Ensure no exacerbations.  Assessment and plan of treatment:	You have a history of congestive heart failure.  You had some evidence of fluid in your lungs indicative of an exacerbation while in the hospital.  You were given Lasix through your IV while in the hospital.  Please continue your home dosage of Lasix 40 mg per day on discharge.  Continue to take Atorvastatin, Carvedilol to help protect your heart.  Secondary Diagnosis:	Urinary tract infection without hematuria, site unspecified  Goal:	Ensure no infections  Assessment and plan of treatment:	You had a urinary tract infection on presentation which was treated with 3 days of antibiotics.  Since then, you had no evidence of infection.

## 2018-05-11 NOTE — BEHAVIORAL HEALTH ASSESSMENT NOTE - RISK ASSESSMENT
low there is no suicidal ideation or plans, no CAH, she is future oriented, no hopelessness, no loss of interest

## 2018-05-11 NOTE — PROGRESS NOTE ADULT - ASSESSMENT
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  Tolerating heparin with stable vital signs.  Mild trop elevation.      -Cont hep gtt, if going to rehab/nursing home can transition to lovenox x 1 month followed by eliquis 2.5mg BID  -Please ambulate and monitor O2 sat

## 2018-05-11 NOTE — DISCHARGE NOTE ADULT - MEDICATION SUMMARY - MEDICATIONS TO TAKE
I will START or STAY ON the medications listed below when I get home from the hospital:    aspirin 81 mg oral delayed release tablet  -- 1 tab(s) by mouth once a day  -- Indication: For CAD (coronary artery disease)    apixaban 5 mg oral tablet  -- 1 tab(s) by mouth every 12 hours  -- Indication: For Pulmonary embolism    sertraline 100 mg oral tablet  -- 2 tab(s) by mouth once a day  -- Indication: For Depression    atorvastatin 80 mg oral tablet  -- 1 tab(s) by mouth once a day  -- Indication: For CAD (coronary artery disease)    ezetimibe 10 mg oral tablet  -- 1 tab(s) by mouth once a day  -- Indication: For Hyperlipidemia    carvedilol 3.125 mg oral tablet  -- 1 tab(s) by mouth 2 times a day  -- Indication: For Chronic systolic heart failure    Lasix 20 mg oral tablet  -- 1 tab(s) by mouth Tuesday, Thursday, Saturday, Sunday  -- Indication: For Chronic systolic heart failure    ferrous sulfate 325 mg (65 mg elemental iron) oral tablet  -- 1 tab(s) by mouth once a day   -- Indication: For Anemia

## 2018-05-11 NOTE — DISCHARGE NOTE ADULT - PATIENT PORTAL LINK FT
You can access the [facility] Patient Portal, offered by [facility], by registering with the following website: [email or web address]

## 2018-05-11 NOTE — PROVIDER CONTACT NOTE (CHANGE IN STATUS NOTIFICATION) - ASSESSMENT
Asymptomatic and stable. BP: 112/71 HR 82, t 98.5 F oral, RR 18, sp02 95% on 2 L NC. Pt. reports no chest pain, sob or palpitations. Lying comforting in beds.

## 2018-05-11 NOTE — BEHAVIORAL HEALTH ASSESSMENT NOTE - NSBHCHARTREVIEWVS_PSY_A_CORE FT
Vital Signs Last 24 Hrs  T(C): 36.9 (11 May 2018 19:16), Max: 36.9 (11 May 2018 04:07)  T(F): 98.5 (11 May 2018 19:16), Max: 98.5 (11 May 2018 04:07)  HR: 82 (11 May 2018 19:16) (80 - 84)  BP: 112/71 (11 May 2018 19:16) (110/61 - 114/72)  BP(mean): --  RR: 18 (11 May 2018 19:16) (17 - 22)  SpO2: 95% (11 May 2018 19:16) (93% - 96%)

## 2018-05-11 NOTE — BEHAVIORAL HEALTH ASSESSMENT NOTE - NSBHCHARTREVIEWLAB_PSY_A_CORE FT
11.4   6.73  )-----------( 115      ( 11 May 2018 07:56 )             34.5     05-11    140  |  103  |  33<H>  ----------------------------<  109<H>  4.2   |  22  |  1.18    Ca    8.5      11 May 2018 06:14  Phos  4.2     05-11  Mg     2.3     05-11    TPro  7.1  /  Alb  3.8  /  TBili  1.8<H>  /  DBili  x   /  AST  15  /  ALT  13  /  AlkPhos  87  05-10    Urinalysis Basic - ( 09 May 2018 22:35 )    Color: Radha / Appearance: SL Turbid / SG: >1.030 / pH: x  Gluc: x / Ketone: Negative  / Bili: Negative / Urobili: 2 mg/dL   Blood: x / Protein: 30 mg/dL / Nitrite: Negative   Leuk Esterase: Large / RBC: 0-2 /HPF / WBC >50 /HPF   Sq Epi: x / Non Sq Epi: Few /HPF / Bacteria: x

## 2018-05-11 NOTE — DISCHARGE NOTE ADULT - SECONDARY DIAGNOSIS.
MDD (major depressive disorder), recurrent, in partial remission Acute on chronic systolic congestive heart failure Urinary tract infection without hematuria, site unspecified

## 2018-05-11 NOTE — BEHAVIORAL HEALTH ASSESSMENT NOTE - NSBHSUICPROTECTFACT_PSY_A_CORE
Identifies reasons for living/Future oriented/High spirituality/Responsibility to family and others/Fear of death or dying due to pain/suffering

## 2018-05-11 NOTE — BEHAVIORAL HEALTH ASSESSMENT NOTE - HPI (INCLUDE ILLNESS QUALITY, SEVERITY, DURATION, TIMING, CONTEXT, MODIFYING FACTORS, ASSOCIATED SIGNS AND SYMPTOMS)
Pt is 89 ys CF, ,  reportedly has 2 sons. She has hx of PE and hx of depression , currently on Zoloft 200 mg daily. She was initially adamant to get this MD business card despite his introduction as psychiatrist. She was engaged sometime sin interview, however sometimes she asked to end it as this MD did not have business card. 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 She referred to herself as believer in God and she will never do anything to hurt herself, she seems to be future oriented , talking about writing a book named " 10 thousands things, She described life as a story for the writer. She described her sleep as good, denied any nightmares or insomnia. 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 She denied any psychosis as AVH or delusions.   She was alert, oriented to person, Place " this is place of doctors.", time " May, 18, 2108" when this MD attempted to ask her for the date se ce stated" I am retired, why the hell I need the date for."   As per nursing, pt has made sexual and racial comments. During interview, pt used sarcasm frequently. She refused to allow tis MD to call her family.

## 2018-05-11 NOTE — PROGRESS NOTE ADULT - PROBLEM SELECTOR PLAN 1
-CTA with acute R-sided PE, no evidence of RHS on CT and per Cards bedside TTE  -Heparin gtt for AC, transition to oral agent for discharge  - CE neg x 2  -appreciate Cards recs  - c/w heparin; tylenol prn pain control.

## 2018-05-11 NOTE — DISCHARGE NOTE ADULT - HOSPITAL COURSE
History of Present Illness  89yo F with PMH of HFrEF (EF ~45%), CAD/MI (2005, s/p PCI), Severe AS, h/o DVT (2014, no longer on Coumadin), s/p PPM ([employer]), and HLD presented to ED on 5/9 with L-sided chest discomfort and associated SOB x 1 week.     In the ED, patient was afebrile, with BP 90s-100s, satting 95-97% on RA. She was given CTX x1, CTA Chest showing PE, Cards consult, and started on Heparin gtt.    Hospital Course  Patient was maintained on heparin.  While in the hospital, patient was seen by psychiatry for concern for tiredness at home and increasing depression. History of Present Illness  89yo F with PMH of HFrEF (EF ~45%), CAD/MI (2005, s/p PCI), Severe AS, h/o DVT (2014, no longer on Coumadin), s/p PPM ([employer]), and HLD presented to ED on 5/9 with L-sided chest discomfort and associated SOB x 1 week.     In the ED, patient was afebrile, with BP 90s-100s, satting 95-97% on RA. She was given CTX x1, CTA Chest showing PE, Cards consult, and started on Heparin gtt.    Hospital Course  Patient was maintained on heparin.  While in the hospital, patient was seen by psychiatry for concern for tiredness at home and increasing depression.  Per psychiatry ___    Patient did have a drop in Hb on 5/17, no source of bleeding was found.  Patient was transitioned to Eliquis on 5/18.  Hb remained stable afterwards. History of Present Illness  89yo F with PMH of HFrEF (EF ~45%), CAD/MI (2005, s/p PCI), Severe AS, h/o DVT (2014, no longer on Coumadin), s/p PPM ([employer]), and HLD presented to ED on 5/9 with L-sided chest discomfort and associated SOB x 1 week.     In the ED, patient was afebrile, with BP 90s-100s, satting 95-97% on RA. She was given CTX x1, CTA Chest showing PE, Cards consult, and started on Heparin gtt.    Hospital Course  Patient was maintained on heparin.  While in the hospital, patient was seen by psychiatry for concern for tiredness at home and increasing depression.  Per psychiatry your depression is controlled on sertraline.      Patient did have a drop in Hb on 5/17, no source of bleeding was found.  Patient was transitioned to Eliquis on 5/18.  Hb remained stable afterwards. 89yo F with PMH of HFrEF (EF ~45%), CAD/MI (2005, s/p PCI), Severe AS, h/o DVT (2014, no longer on Coumadin), s/p PPM ([employer]), and HLD presented to ED on 5/9 with L-sided chest discomfort and associated SOB x 1 week. A CTA Chest showed a pulmonary embolism. The patient was started on a heparin infusion and cardiology was consulted. The patient had multiple episodes of agitation requiring psychiatry input. A CT Head was performed which showed no evidence of acute pathology. She was continued on her home antidepressants and these episodes were controlled. Her hemoglobin was noted to have dropped so a CT A/P was performed which showed no evidence of an RP bleed. She developed an HARPREET which resolved after a small amount of IV fluids. She was transitioned to Eliquis with no episodes of bleeding and a stable hemoglobin. She is now medically stable and cleared for discharge to subacute rehabilitation.

## 2018-05-11 NOTE — DISCHARGE NOTE ADULT - CARE PROVIDER_API CALL
Dionte Lynch (DO), Internal Medicine; Nuclear Cardiology  [address] [place] [address]  Phone: [phone]  Fax: [phone]

## 2018-05-11 NOTE — PROGRESS NOTE ADULT - SUBJECTIVE AND OBJECTIVE BOX
Patient is a 89y old  Female who presents with a chief complaint of Pulmonary Embolism (10 May 2018 02:29)    Interval History: Pt examined at bedside.  Overnight, patient had pain, was given 1g IV tylenol at 6pm for pain under right breast.  Per nursing, patient was screaming this am about pain.  States pain is better this am.  Pain is still present in right breast area.  SOB is improved.  Pt c/o nausea, no vomiting.    MEDICATIONS  (STANDING):  aspirin enteric coated 81 milliGRAM(s) Oral daily  atorvastatin 80 milliGRAM(s) Oral at bedtime  cefTRIAXone   IVPB 1 Gram(s) IV Intermittent every 24 hours  heparin  Infusion.  Unit(s)/Hr (11 mL/Hr) IV Continuous <Continuous>  sertraline 200 milliGRAM(s) Oral daily    MEDICATIONS  (PRN):  acetaminophen   Tablet. 650 milliGRAM(s) Oral every 6 hours PRN Mild and Moderate Pain (1 - 6)  heparin  Injectable 5000 Unit(s) IV Push every 6 hours PRN For aPTT less than 40  heparin  Injectable 2500 Unit(s) IV Push every 6 hours PRN For aPTT between 40 - 57      Objective    Vital Signs Last 24 Hrs  T(C): 36.9 (11 May 2018 04:07), Max: 36.9 (10 May 2018 13:45)  T(F): 98.5 (11 May 2018 04:07), Max: 98.5 (10 May 2018 13:45)  HR: 84 (11 May 2018 04:07) (80 - 84)  BP: 114/72 (11 May 2018 04:07) (102/64 - 114/72)  BP(mean): --  RR: 22 (11 May 2018 04:07) (18 - 22)  SpO2: 95% (11 May 2018 04:07) (95% - 96%)      CAPILLARY BLOOD GLUCOSE            05-10-18 @ 07:01  -  05-11-18 @ 07:00  --------------------------------------------------------  IN: 1180 mL / OUT: 250 mL / NET: 930 mL    05-11-18 @ 07:01  -  05-11-18 @ 09:22  --------------------------------------------------------  IN: 0 mL / OUT: 0 mL / NET: 0 mL        Appearance: Sitting in chair, NAD  Cardiovascular: RRR, No murmurs, gallops or rubs appreciated  Respiratory: Lungs clear to auscultation bilaterally, no wheezes, crackles appreciated  Gastrointestinal:  Soft, nondistended, nontender, +BS	  Skin: No rashes, eccymosis or cyanosis noted	  Neurologic: AOx3, CNII-XII grossly intact, motor and sensory function grossly intact  Extremities: Moving all extremities equally, no edema noted.  Vascular: palpable dp, pt and radial pulses 2+ bilaterally  Psych:  Agitated this am, Normal mood and affect, responds to questions appropriately      05-11    140  |  103  |  33<H>  ----------------------------<  109<H>  4.2   |  22  |  1.18  05-10    141  |  102  |  25<H>  ----------------------------<  121<H>  3.9   |  26  |  1.16  05-09    142  |  102  |  25<H>  ----------------------------<  152<H>  4.6   |  26  |  1.15    Ca    8.5      11 May 2018 06:14  Ca    8.9      10 May 2018 05:06  Ca    8.7      09 May 2018 20:30  Phos  4.2     05-11  Mg     2.3     05-11    TPro  7.1  /  Alb  3.8  /  TBili  1.8<H>  /  DBili  x   /  AST  15  /  ALT  13  /  AlkPhos  87  05-10  TPro  7.0  /  Alb  3.5  /  TBili  2.1<H>  /  DBili  x   /  AST  16  /  ALT  13  /  AlkPhos  84  05-09      PT/INR - ( 10 May 2018 05:06 )   PT: 14.5 sec;   INR: 1.33 ratio         PTT - ( 10 May 2018 05:06 )  PTT:71.5 sec              Urinalysis Basic - ( 09 May 2018 22:35 )    Color: Radha / Appearance: SL Turbid / SG: >1.030 / pH: x  Gluc: x / Ketone: Negative  / Bili: Negative / Urobili: 2 mg/dL   Blood: x / Protein: 30 mg/dL / Nitrite: Negative   Leuk Esterase: Large / RBC: 0-2 /HPF / WBC >50 /HPF   Sq Epi: x / Non Sq Epi: Few /HPF / Bacteria: x                              11.4   6.73  )-----------( 115      ( 11 May 2018 07:56 )             34.5                         12.8   11.0  )-----------( 114      ( 10 May 2018 05:06 )             38.7                         13.4   9.5   )-----------( 123      ( 09 May 2018 20:30 )             40.9     CAPILLARY BLOOD GLUCOSE        Blood Gas Source Venous: Venous (05-09 @ 20:30)        Radiology & Imaging    Imaging Personally Reviewed:    Consultant Notes Reviewed Rachael Ashford MD, PhD  PGY-1| Internal Medicine  [phone] / [id number]  Team 1  ===========================    Patient is a 89y old  Female who presents with a chief complaint of Pulmonary Embolism (10 May 2018 02:29)    Interval History: Pt examined at bedside.  Overnight, patient had pain, was given 1g IV tylenol at 6pm for pain under right breast.  Per nursing, patient was screaming this am about pain.  States pain is better this am.  Pain is still present in right breast area.  SOB is improved.  Pt c/o nausea, no vomiting.  Overnight, patient was also agitated and inappropriate.  When asked, patient stated she was unable to get someone to help with going to the bathroom and was upset about this.  On tele, SR 1st degree block, v-paced in 70s-90s    MEDICATIONS  (STANDING):  aspirin enteric coated 81 milliGRAM(s) Oral daily  atorvastatin 80 milliGRAM(s) Oral at bedtime  cefTRIAXone   IVPB 1 Gram(s) IV Intermittent every 24 hours  heparin  Infusion.  Unit(s)/Hr (11 mL/Hr) IV Continuous <Continuous>  sertraline 200 milliGRAM(s) Oral daily    MEDICATIONS  (PRN):  acetaminophen   Tablet. 650 milliGRAM(s) Oral every 6 hours PRN Mild and Moderate Pain (1 - 6)  heparin  Injectable 5000 Unit(s) IV Push every 6 hours PRN For aPTT less than 40  heparin  Injectable 2500 Unit(s) IV Push every 6 hours PRN For aPTT between 40 - 57      Objective    Vital Signs Last 24 Hrs  T(C): 36.9 (11 May 2018 04:07), Max: 36.9 (10 May 2018 13:45)  T(F): 98.5 (11 May 2018 04:07), Max: 98.5 (10 May 2018 13:45)  HR: 84 (11 May 2018 04:07) (80 - 84)  BP: 114/72 (11 May 2018 04:07) (102/64 - 114/72)  BP(mean): --  RR: 22 (11 May 2018 04:07) (18 - 22)  SpO2: 95% (11 May 2018 04:07) (95% - 96%)      CAPILLARY BLOOD GLUCOSE            05-10-18 @ 07:01  -  05-11-18 @ 07:00  --------------------------------------------------------  IN: 1180 mL / OUT: 250 mL / NET: 930 mL    05-11-18 @ 07:01  -  05-11-18 @ 09:22  --------------------------------------------------------  IN: 0 mL / OUT: 0 mL / NET: 0 mL        Appearance: Sitting in chair, NAD  Cardiovascular: RRR, No murmurs, gallops or rubs appreciated  Respiratory: Lungs clear to auscultation bilaterally, no wheezes, crackles appreciated  Gastrointestinal:  Soft, nondistended, nontender, +BS	  Skin: No rashes, eccymosis or cyanosis noted	  Neurologic: AOx3, CNII-XII grossly intact, motor and sensory function grossly intact  Extremities: Moving all extremities equally, no edema noted.  Vascular: palpable dp, pt and radial pulses 2+ bilaterally  Psych:  Agitated this am, Normal mood and affect, responds to questions appropriately      05-11    140  |  103  |  33<H>  ----------------------------<  109<H>  4.2   |  22  |  1.18  05-10    141  |  102  |  25<H>  ----------------------------<  121<H>  3.9   |  26  |  1.16  05-09    142  |  102  |  25<H>  ----------------------------<  152<H>  4.6   |  26  |  1.15    Ca    8.5      11 May 2018 06:14  Ca    8.9      10 May 2018 05:06  Ca    8.7      09 May 2018 20:30  Phos  4.2     05-11  Mg     2.3     05-11    TPro  7.1  /  Alb  3.8  /  TBili  1.8<H>  /  DBili  x   /  AST  15  /  ALT  13  /  AlkPhos  87  05-10  TPro  7.0  /  Alb  3.5  /  TBili  2.1<H>  /  DBili  x   /  AST  16  /  ALT  13  /  AlkPhos  84  05-09      PT/INR - ( 10 May 2018 05:06 )   PT: 14.5 sec;   INR: 1.33 ratio         PTT - ( 10 May 2018 05:06 )  PTT:71.5 sec              Urinalysis Basic - ( 09 May 2018 22:35 )    Color: Radha / Appearance: SL Turbid / SG: >1.030 / pH: x  Gluc: x / Ketone: Negative  / Bili: Negative / Urobili: 2 mg/dL   Blood: x / Protein: 30 mg/dL / Nitrite: Negative   Leuk Esterase: Large / RBC: 0-2 /HPF / WBC >50 /HPF   Sq Epi: x / Non Sq Epi: Few /HPF / Bacteria: x                              11.4   6.73  )-----------( 115      ( 11 May 2018 07:56 )             34.5                         12.8   11.0  )-----------( 114      ( 10 May 2018 05:06 )             38.7                         13.4   9.5   )-----------( 123      ( 09 May 2018 20:30 )             40.9     CAPILLARY BLOOD GLUCOSE        Blood Gas Source Venous: Venous (05-09 @ 20:30)        Radiology & Imaging    Imaging Personally Reviewed:    Consultant Notes Reviewed

## 2018-05-11 NOTE — DISCHARGE NOTE ADULT - MEDICATION SUMMARY - MEDICATIONS TO CHANGE
I will SWITCH the dose or number of times a day I take the medications listed below when I get home from the hospital:    furosemide 40 mg oral tablet  -- 1 tab(s) by mouth once a day    aspirin 325 mg oral tablet  -- 1 tab(s) by mouth once a day

## 2018-05-11 NOTE — PROGRESS NOTE ADULT - ASSESSMENT
89yo F with PMH of HFrEF (EF ~45%), CAD/MI (2005, s/p PCI), Severe AS, h/o DVT (2014, no longer on Coumadin), s/p PPM ([employer]), and HLD p/w CP/SOB found to have CT evidence of submassive R-sided PE w/ elevated BNP, now on hep gtt improving, still with pain.  Tylenol helping with pain.

## 2018-05-12 DIAGNOSIS — F03.90 UNSPECIFIED DEMENTIA WITHOUT BEHAVIORAL DISTURBANCE: ICD-10-CM

## 2018-05-12 LAB
ANION GAP SERPL CALC-SCNC: 14 MMOL/L — SIGNIFICANT CHANGE UP (ref 5–17)
APTT BLD: 69.4 SEC — HIGH (ref 27.5–37.4)
BUN SERPL-MCNC: 39 MG/DL — HIGH (ref 7–23)
CALCIUM SERPL-MCNC: 8.9 MG/DL — SIGNIFICANT CHANGE UP (ref 8.4–10.5)
CHLORIDE SERPL-SCNC: 104 MMOL/L — SIGNIFICANT CHANGE UP (ref 96–108)
CO2 SERPL-SCNC: 23 MMOL/L — SIGNIFICANT CHANGE UP (ref 22–31)
CREAT SERPL-MCNC: 1.28 MG/DL — SIGNIFICANT CHANGE UP (ref 0.5–1.3)
GLUCOSE SERPL-MCNC: 118 MG/DL — HIGH (ref 70–99)
HCT VFR BLD CALC: 33.4 % — LOW (ref 34.5–45)
HGB BLD-MCNC: 11 G/DL — LOW (ref 11.5–15.5)
MAGNESIUM SERPL-MCNC: 2.5 MG/DL — SIGNIFICANT CHANGE UP (ref 1.6–2.6)
MCHC RBC-ENTMCNC: 31.1 PG — SIGNIFICANT CHANGE UP (ref 27–34)
MCHC RBC-ENTMCNC: 32.9 GM/DL — SIGNIFICANT CHANGE UP (ref 32–36)
MCV RBC AUTO: 94.4 FL — SIGNIFICANT CHANGE UP (ref 80–100)
PHOSPHATE SERPL-MCNC: 4.5 MG/DL — SIGNIFICANT CHANGE UP (ref 2.5–4.5)
PLATELET # BLD AUTO: 136 K/UL — LOW (ref 150–400)
POTASSIUM SERPL-MCNC: 4.4 MMOL/L — SIGNIFICANT CHANGE UP (ref 3.5–5.3)
POTASSIUM SERPL-SCNC: 4.4 MMOL/L — SIGNIFICANT CHANGE UP (ref 3.5–5.3)
RBC # BLD: 3.54 M/UL — LOW (ref 3.8–5.2)
RBC # FLD: 13.9 % — SIGNIFICANT CHANGE UP (ref 10.3–14.5)
SODIUM SERPL-SCNC: 141 MMOL/L — SIGNIFICANT CHANGE UP (ref 135–145)
WBC # BLD: 8.01 K/UL — SIGNIFICANT CHANGE UP (ref 3.8–10.5)
WBC # FLD AUTO: 8.01 K/UL — SIGNIFICANT CHANGE UP (ref 3.8–10.5)

## 2018-05-12 PROCEDURE — 99233 SBSQ HOSP IP/OBS HIGH 50: CPT | Mod: GC

## 2018-05-12 PROCEDURE — 99232 SBSQ HOSP IP/OBS MODERATE 35: CPT | Mod: GC

## 2018-05-12 RX ADMIN — CARVEDILOL PHOSPHATE 3.12 MILLIGRAM(S): 80 CAPSULE, EXTENDED RELEASE ORAL at 23:24

## 2018-05-12 RX ADMIN — Medication 81 MILLIGRAM(S): at 11:14

## 2018-05-12 RX ADMIN — SERTRALINE 200 MILLIGRAM(S): 25 TABLET, FILM COATED ORAL at 11:15

## 2018-05-12 RX ADMIN — SERTRALINE 200 MILLIGRAM(S): 25 TABLET, FILM COATED ORAL at 00:07

## 2018-05-12 RX ADMIN — CARVEDILOL PHOSPHATE 3.12 MILLIGRAM(S): 80 CAPSULE, EXTENDED RELEASE ORAL at 00:07

## 2018-05-12 RX ADMIN — ATORVASTATIN CALCIUM 80 MILLIGRAM(S): 80 TABLET, FILM COATED ORAL at 21:30

## 2018-05-12 RX ADMIN — HEPARIN SODIUM 1200 UNIT(S)/HR: 5000 INJECTION INTRAVENOUS; SUBCUTANEOUS at 01:54

## 2018-05-12 RX ADMIN — CARVEDILOL PHOSPHATE 3.12 MILLIGRAM(S): 80 CAPSULE, EXTENDED RELEASE ORAL at 11:15

## 2018-05-12 NOTE — PROGRESS NOTE ADULT - PROBLEM SELECTOR PLAN 3
-UCx neg  - UA is pos for LE so will tx with ceftriaxone for now.  - will treat with 3 days of ceftriaxone. - will treat with 3 days of ceftriaxone, s/p treatment. pt asymptomatic

## 2018-05-12 NOTE — PROGRESS NOTE ADULT - ATTENDING COMMENTS
Pt examined at bedside.  Agree with Dr Valencia's note.    89F here with SOB, CP and found to have right lower and segmental PE, CTA chest with no right heart strain.  Continue heparin gtt , monitor CBC and PTT.  Plan for d/c on eliquis with lovenox bridge.    Pt with chronic systolic HF- appears euvolemic- resumed lasix, TTE noted with EF 35-40%. Appreciate cardiology input.    Lydia Box MD  [phone]  Internal Medicine, Hospitalist Pt examined at bedside.  Agree with Dr Valencia's note.    89F here with SOB, CP and found to have right lower and segmental PE, CTA chest with no right heart strain.  Continue heparin gtt , monitor CBC and PTT.  Plan for d/c on eliquis with lovenox bridge.    Pt with chronic systolic HF- appears euvolemic- restart lasix?, TTE noted with EF 35-40%. Appreciate cardiology input.    Lydia Box MD  [phone]  Internal Medicine, Hospitalist

## 2018-05-12 NOTE — PROGRESS NOTE BEHAVIORAL HEALTH - NSBHFUPINTERVALHXFT_PSY_A_CORE
Ms Quinteros interviewed by team at bedside. She is amenable to conversation, but speaks excessively and reprimands interviewers when they interrupt. She tells team she feels "terrible" and regales them with (numerous) stories of her youth working in [employer]. She does not relate these stories to the present moment, beyond wanting to express "how things used to be." She endorses poor mood and frustration with her worsening medical conditions (so debilitating that she rarely leaves her house), but tells team she has no idea why psychiatry needs to see her. She informs team that her memory is "excellent" and recites the preamble to the [place] Constitution (correctly). Though talkative and coherent, Ms Quinteros is difficult to redirect so formal memory testing is not conducted. However, she does fail to recall how or why she was admitted to the hospital. As team tries to close interview, Ms Quinteros becomes very offended, telling team that all doctors have been abandoning her though out her stay, and that the team's behavior is unprofessional. She also accuses team of having too-frequently interrupted her in interview, giving her no chance to speak. When interviewer explains that they had in fact allowed her to speak for 45 minutes, she is dismissive. She denies suicidality. Ms Quinteros interviewed by team at bedside. She is amenable to conversation, but speaks excessively and reprimands interviewers when they interrupt. She tells team she feels "terrible" and regales them with (numerous) stories of her youth working in [employer]. She does not relate these stories to the present moment, beyond wanting to express "how things used to be." She endorses poor mood and frustration with her worsening medical conditions (so debilitating that she rarely leaves her house), but tells team she has no idea why psychiatry needs to see her. She informs team that her memory is "excellent" and recites the preamble to the [place] Constitution (correctly). Though talkative and coherent, Ms Quinteros is difficult to redirect so formal memory testing is not conducted. However, she does fail to recall how or why she was admitted to the hospital. She also denies having seen [facility] on 5/11.    As team tries to close interview, Ms Quinteros becomes very offended, telling team that all doctors have been abandoning her though out her stay, and that the team's behavior is unprofessional. She also accuses team of having too-frequently interrupted her in interview, giving her no chance to speak. When interviewer explains that they had in fact allowed her to speak for 45 minutes, she is dismissive. She denies suicidality.

## 2018-05-12 NOTE — PROVIDER CONTACT NOTE (OTHER) - BACKGROUND
Pt. admitted w/ SOB & PE. Pt. has hx of depression and psych consult was done 5/11. Pt. has hx of getting agitated.

## 2018-05-12 NOTE — PROGRESS NOTE ADULT - PROBLEM SELECTOR PLAN 8
-DVT PPX: on Heparin gtt  -DIET: DASH  -DISPO: PT Eval  -CODE STATUS: Full Code -DVT PPX: on Heparin gtt  -DIET: DASH  -DISPO: PT Eval  -CODE STATUS: Full Code        Dionte Valencia MD  PGY-3 | Internal Medicine  Pager: [phone]

## 2018-05-12 NOTE — PROGRESS NOTE ADULT - PROBLEM SELECTOR PLAN 6
- holding lasix in setting of PE  - will monitor and restart as appropriate.  - EF 35-40% - holding lasix in setting of PE  - will monitor and restart as appropriate.  - EF 35-40%, VTI 13cm  - patient currently euvolemic on exam. Not hypoxic.

## 2018-05-12 NOTE — PROVIDER CONTACT NOTE (OTHER) - SITUATION
Pt. is very agitated and frustrated this morning and wants to go home to her family. Pt. tried to call her son but he's not answering the phone.

## 2018-05-12 NOTE — PROGRESS NOTE ADULT - ASSESSMENT
89yo F with PMH of HFrEF (EF ~45%), CAD/MI (2005, s/p PCI), Severe AS, h/o DVT (2014, no longer on Coumadin), s/p PPM ([employer]), and HLD p/w CP/SOB found to have CT evidence of submassive R-sided PE w/ elevated BNP, now on hep gtt improving, still with pain.  Tylenol helping with pain. 89yo F with PMH of HFrEF (EF ~45%), CAD/MI (2005, s/p PCI), Severe AS, h/o DVT (2014, no longer on Coumadin), s/p PPM ([employer]), and HLD p/w CP/SOB found to have CT evidence of submassive R-sided PE w/ elevated BNP, now on hep gtt improving. Currently in no pain.

## 2018-05-12 NOTE — PROGRESS NOTE ADULT - SUBJECTIVE AND OBJECTIVE BOX
Overnight Events:    REVIEW OF SYSTEMS:  CONSTITUTIONAL: No weakness, fevers or chills  EYES/ENT: No visual changes, no throat pain   RESPIRATORY: No cough, wheezing, hemoptysis; No shortness of breath  CARDIOVASCULAR: No chest pain or palpitations  GASTROINTESTINAL: No abdominal, nausea, vomiting, or hematemesis; No diarrhea or constipation. No melena or hematochezia.  GENITOURINARY: No dysuria, frequency or hematuria  NEUROLOGICAL: No dizziness, numbness, or weakness  SKIN: No itching, burning, rashes, or lesions   All other review of systems is negative unless indicated above.    VITAL SIGNS:  Vital Signs Last 24 Hrs  T(C): 36.7 (12 May 2018 05:08), Max: 36.9 (11 May 2018 19:16)  T(F): 98 (12 May 2018 05:08), Max: 98.5 (11 May 2018 19:16)  HR: 75 (12 May 2018 05:08) (75 - 85)  BP: 106/66 (12 May 2018 05:08) (106/66 - 120/65)  BP(mean): --  RR: 18 (12 May 2018 05:08) (17 - 18)  SpO2: 95% (12 May 2018 05:08) (93% - 96%)    PHYSICAL EXAM:   GENERAL: no acute distress  HEENT: NC/AT, EOMI, neck supple, MMM  RESPIRATORY: LCTAB/L, no rhonchi, rales, or wheezing  CARDIOVASCULAR: RRR, no murmurs, gallops, rubs  ABDOMINAL: soft, non-tender, non-distended, positive bowel sounds   EXTREMITIES: no clubbing, cyanosis, or edema  NEUROLOGICAL: alert and oriented x 3, non-focal  SKIN: no rashes or lesions   MUSCULOSKELETAL: no gross joint deformity                          11.4   6.73  )-----------( 115      ( 11 May 2018 07:56 )             34.5     05-12    141  |  104  |  39<H>  ----------------------------<  118<H>  4.4   |  23  |  1.28    Ca    8.9      12 May 2018 05:49  Phos  4.5     05-12  Mg     2.5     05-12      PTT - ( 12 May 2018 01:29 )  PTT:69.4 sec    CAPILLARY BLOOD GLUCOSE        I&O's Summary    11 May 2018 07:01  -  12 May 2018 07:00  --------------------------------------------------------  IN: 584 mL / OUT: 250 mL / NET: 334 mL        MEDICATIONS  (STANDING):  aspirin enteric coated 81 milliGRAM(s) Oral daily  atorvastatin 80 milliGRAM(s) Oral at bedtime  carvedilol 3.125 milliGRAM(s) Oral every 12 hours  heparin  Infusion.  Unit(s)/Hr (11 mL/Hr) IV Continuous <Continuous>  sertraline 200 milliGRAM(s) Oral daily  sertraline 200 milliGRAM(s) Oral daily Overnight Events: No overnight events. Patient electrically stable. This AM, patient talkative with flight of ideas manner of communicating, calm. Restricted mood and affect.     REVIEW OF SYSTEMS:  CONSTITUTIONAL: No weakness, fevers or chills  EYES/ENT: No visual changes, no throat pain   RESPIRATORY: No cough, wheezing, hemoptysis; No shortness of breath  CARDIOVASCULAR: No chest pain or palpitations  GASTROINTESTINAL: No abdominal, nausea, vomiting, or hematemesis; No diarrhea or constipation. No melena or hematochezia.  GENITOURINARY: No dysuria, frequency or hematuria  NEUROLOGICAL: No dizziness, numbness, or weakness  SKIN: No itching, burning, rashes, or lesions   All other review of systems is negative unless indicated above.    VITAL SIGNS:  Vital Signs Last 24 Hrs  T(C): 36.7 (12 May 2018 05:08), Max: 36.9 (11 May 2018 19:16)  T(F): 98 (12 May 2018 05:08), Max: 98.5 (11 May 2018 19:16)  HR: 75 (12 May 2018 05:08) (75 - 85)  BP: 106/66 (12 May 2018 05:08) (106/66 - 120/65)  BP(mean): --  RR: 18 (12 May 2018 05:08) (17 - 18)  SpO2: 95% (12 May 2018 05:08) (93% - 96%)    PHYSICAL EXAM:   GENERAL: no acute distress  HEENT: NC/AT, EOMI, neck supple, MMM  RESPIRATORY: LCTAB/L, no rhonchi, rales, or wheezing  CARDIOVASCULAR: RRR, no murmurs, gallops, rubs  ABDOMINAL: soft, non-tender, non-distended, positive bowel sounds   EXTREMITIES: no clubbing, cyanosis, or edema  NEUROLOGICAL: alert and oriented x 3, non-focal  SKIN: no rashes or lesions. +UE bruising   MUSCULOSKELETAL: no gross joint deformity                          11.4   6.73  )-----------( 115      ( 11 May 2018 07:56 )             34.5     05-12    141  |  104  |  39<H>  ----------------------------<  118<H>  4.4   |  23  |  1.28    Ca    8.9      12 May 2018 05:49  Phos  4.5     05-12  Mg     2.5     05-12      PTT - ( 12 May 2018 01:29 )  PTT:69.4 sec    CAPILLARY BLOOD GLUCOSE        I&O's Summary    11 May 2018 07:01  -  12 May 2018 07:00  --------------------------------------------------------  IN: 584 mL / OUT: 250 mL / NET: 334 mL        MEDICATIONS  (STANDING):  aspirin enteric coated 81 milliGRAM(s) Oral daily  atorvastatin 80 milliGRAM(s) Oral at bedtime  carvedilol 3.125 milliGRAM(s) Oral every 12 hours  heparin  Infusion.  Unit(s)/Hr (11 mL/Hr) IV Continuous <Continuous>  sertraline 200 milliGRAM(s) Oral daily  sertraline 200 milliGRAM(s) Oral daily

## 2018-05-13 DIAGNOSIS — N17.9 ACUTE KIDNEY FAILURE, UNSPECIFIED: ICD-10-CM

## 2018-05-13 LAB
ANION GAP SERPL CALC-SCNC: 15 MMOL/L — SIGNIFICANT CHANGE UP (ref 5–17)
APTT BLD: 66.4 SEC — HIGH (ref 27.5–37.4)
BUN SERPL-MCNC: 50 MG/DL — HIGH (ref 7–23)
CALCIUM SERPL-MCNC: 8.8 MG/DL — SIGNIFICANT CHANGE UP (ref 8.4–10.5)
CHLORIDE SERPL-SCNC: 102 MMOL/L — SIGNIFICANT CHANGE UP (ref 96–108)
CO2 SERPL-SCNC: 23 MMOL/L — SIGNIFICANT CHANGE UP (ref 22–31)
CREAT ?TM UR-MCNC: 128 MG/DL — SIGNIFICANT CHANGE UP
CREAT SERPL-MCNC: 1.47 MG/DL — HIGH (ref 0.5–1.3)
GLUCOSE SERPL-MCNC: 98 MG/DL — SIGNIFICANT CHANGE UP (ref 70–99)
HCT VFR BLD CALC: 30.7 % — LOW (ref 34.5–45)
HGB BLD-MCNC: 10.3 G/DL — LOW (ref 11.5–15.5)
MAGNESIUM SERPL-MCNC: 2.4 MG/DL — SIGNIFICANT CHANGE UP (ref 1.6–2.6)
MCHC RBC-ENTMCNC: 30.8 PG — SIGNIFICANT CHANGE UP (ref 27–34)
MCHC RBC-ENTMCNC: 33.6 GM/DL — SIGNIFICANT CHANGE UP (ref 32–36)
MCV RBC AUTO: 91.9 FL — SIGNIFICANT CHANGE UP (ref 80–100)
PHOSPHATE SERPL-MCNC: 4 MG/DL — SIGNIFICANT CHANGE UP (ref 2.5–4.5)
PLATELET # BLD AUTO: 136 K/UL — LOW (ref 150–400)
POTASSIUM SERPL-MCNC: 4 MMOL/L — SIGNIFICANT CHANGE UP (ref 3.5–5.3)
POTASSIUM SERPL-SCNC: 4 MMOL/L — SIGNIFICANT CHANGE UP (ref 3.5–5.3)
RBC # BLD: 3.34 M/UL — LOW (ref 3.8–5.2)
RBC # FLD: 14.3 % — SIGNIFICANT CHANGE UP (ref 10.3–14.5)
SODIUM SERPL-SCNC: 140 MMOL/L — SIGNIFICANT CHANGE UP (ref 135–145)
SODIUM UR-SCNC: 21 MMOL/L — SIGNIFICANT CHANGE UP
UUN UR-MCNC: 1389 MG/DL — SIGNIFICANT CHANGE UP
WBC # BLD: 5.39 K/UL — SIGNIFICANT CHANGE UP (ref 3.8–10.5)
WBC # FLD AUTO: 5.39 K/UL — SIGNIFICANT CHANGE UP (ref 3.8–10.5)

## 2018-05-13 PROCEDURE — 99233 SBSQ HOSP IP/OBS HIGH 50: CPT | Mod: GC

## 2018-05-13 RX ADMIN — CARVEDILOL PHOSPHATE 3.12 MILLIGRAM(S): 80 CAPSULE, EXTENDED RELEASE ORAL at 23:43

## 2018-05-13 RX ADMIN — ATORVASTATIN CALCIUM 80 MILLIGRAM(S): 80 TABLET, FILM COATED ORAL at 21:55

## 2018-05-13 RX ADMIN — Medication 81 MILLIGRAM(S): at 12:02

## 2018-05-13 RX ADMIN — SERTRALINE 200 MILLIGRAM(S): 25 TABLET, FILM COATED ORAL at 12:01

## 2018-05-13 RX ADMIN — CARVEDILOL PHOSPHATE 3.12 MILLIGRAM(S): 80 CAPSULE, EXTENDED RELEASE ORAL at 12:01

## 2018-05-13 RX ADMIN — HEPARIN SODIUM 1200 UNIT(S)/HR: 5000 INJECTION INTRAVENOUS; SUBCUTANEOUS at 09:49

## 2018-05-13 NOTE — PROGRESS NOTE ADULT - PROBLEM SELECTOR PLAN 7
- holding lasix in setting of PE  - EF 35-40%, VTI 13cm  - patient currently euvolemic on exam. Not hypoxic.

## 2018-05-13 NOTE — PROGRESS NOTE ADULT - SUBJECTIVE AND OBJECTIVE BOX
Rachael Ashford MD, PhD  PGY-1| Internal Medicine  [phone] / [id number]  Team 1  =============================================    Patient is a 89y old  Female who presents with a chief complaint of Pulmonary Embolism (11 May 2018 18:51)    Interval History: Pt examined at bedside. No acute events overnight.  Pt states she is sleepy this am.  Denies fevers, chills, sob overnight.    MEDICATIONS  (STANDING):  aspirin enteric coated 81 milliGRAM(s) Oral daily  atorvastatin 80 milliGRAM(s) Oral at bedtime  carvedilol 3.125 milliGRAM(s) Oral every 12 hours  heparin  Infusion.  Unit(s)/Hr (11 mL/Hr) IV Continuous <Continuous>  sertraline 200 milliGRAM(s) Oral daily    MEDICATIONS  (PRN):  acetaminophen   Tablet. 650 milliGRAM(s) Oral every 6 hours PRN Mild and Moderate Pain (1 - 6)  heparin  Injectable 5000 Unit(s) IV Push every 6 hours PRN For aPTT less than 40  heparin  Injectable 2500 Unit(s) IV Push every 6 hours PRN For aPTT between 40 - 57      Objective    Vital Signs Last 24 Hrs  T(C): 36.8 (13 May 2018 04:45), Max: 36.8 (12 May 2018 21:36)  T(F): 98.3 (13 May 2018 04:45), Max: 98.3 (12 May 2018 21:36)  HR: 69 (13 May 2018 04:45) (69 - 81)  BP: 102/61 (13 May 2018 04:45) (101/55 - 104/56)  BP(mean): --  RR: 18 (13 May 2018 04:45) (18 - 18)  SpO2: 97% (13 May 2018 04:45) (92% - 97%)      CAPILLARY BLOOD GLUCOSE            05-12-18 @ 07:01  -  05-13-18 @ 07:00  --------------------------------------------------------  IN: 1024 mL / OUT: 550 mL / NET: 474 mL        Appearance: Laying in bed, sleeping  Cardiovascular: RRR, Holosytolic murmur best heard at left upper sternal border, no gallops or rubs appreciated  Respiratory: Lungs clear to auscultation bilaterally, no wheezes, crackles appreciated  Gastrointestinal:  Soft, nondistended, nontender, +BS	  Skin: No rashes, eccymosis or cyanosis noted	  Neurologic: AOx3  Extremities: Moving all extremities equally; no edema note  Psych: Less talkative than usual.      05-13    140  |  102  |  50<H>  ----------------------------<  98  4.0   |  23  |  1.47<H>  05-12    141  |  104  |  39<H>  ----------------------------<  118<H>  4.4   |  23  |  1.28  05-11    140  |  103  |  33<H>  ----------------------------<  109<H>  4.2   |  22  |  1.18    Ca    8.8      13 May 2018 06:26  Ca    8.9      12 May 2018 05:49  Ca    8.5      11 May 2018 06:14  Phos  4.0     05-13  Mg     2.4     05-13        PTT - ( 12 May 2018 01:29 )  PTT:69.4 sec                                        10.3   5.39  )-----------( 136      ( 13 May 2018 08:24 )             30.7                         11.0   8.01  )-----------( 136      ( 12 May 2018 07:46 )             33.4                         11.4   6.73  )-----------( 115      ( 11 May 2018 07:56 )             34.5     CAPILLARY BLOOD GLUCOSE              Radiology & Imaging    Imaging Personally Reviewed:    Consultant Notes Reviewed

## 2018-05-13 NOTE — PROGRESS NOTE ADULT - ATTENDING COMMENTS
Pt seen and examined at bedside.   Agree w/ Dr. Ashford's note  Briefly, this is a 89F with hx of dementia, CAD, sCHF, dvt off of coumadin who presents with PE on heparin gtt.   Pt this am confused and tangential but calm and cooperative.   Labs reviewed and notable for mild increase in Cr.   - c/w heparin gtt  - Urine studies and bladder scan for HARPREET. Pt is not hypervoluemic on exam, may be prerenal in etiology. No nephrotoxic agents noted.   - Trend Cr.     Luca Palma MD  [phone] Pt seen and examined at bedside.   Agree w/ Dr. Ashford's note  Briefly, this is a 89F with hx of dementia, CAD, sCHF, dvt off of coumadin who presents with PE on heparin gtt.   Pt this am confused (baseline) but calm and cooperative  Labs reviewed and notable for mild increase in Cr.   - c/w heparin gtt  - Urine studies and bladder scan for HARPREET. Pt is not hypervoluemic on exam, strong suspicion that it is prerenal in etiology given poor PO intak.  No nephrotoxic agents noted.   - Trend Cr.     Luca Palma MD  [phone]

## 2018-05-13 NOTE — PROGRESS NOTE ADULT - PROBLEM SELECTOR PLAN 2
Pt with history of depression, now on sertraline.  Seen by psych yesterday and the day prior, no concerns for SI.    - Will need further eval prior to discharge  - c/w sertraline

## 2018-05-13 NOTE — PROGRESS NOTE ADULT - PROBLEM SELECTOR PLAN 9
-DVT PPX: on Heparin gtt  -DIET: DASH  -DISPO: Rehab, pending discussion with pt and family.  -CODE STATUS: Full Code        Dionte Valencia MD  PGY-3 | Internal Medicine  Pager: [phone] -DVT PPX: on Heparin gtt  -DIET: DASH  -DISPO: Rehab, pending discussion with pt and family.  -CODE STATUS: Full Code

## 2018-05-13 NOTE — PROGRESS NOTE ADULT - ASSESSMENT
89yo F with PMH of HFrEF (EF ~45%), CAD/MI (2005, s/p PCI), Severe AS, h/o DVT (2014, no longer on Coumadin), s/p PPM ([employer]), and HLD p/w CP/SOB found to have CT evidence of submassive R-sided PE w/ elevated BNP, now on hep gtt improving. Currently in no pain; pt now with HARPREET this am.

## 2018-05-13 NOTE — PROGRESS NOTE ADULT - PROBLEM SELECTOR PLAN 1
Pt with HARPREET this am, denies dysuria but does endorse frequency.  Pt now s/p ceftriaxone for tx for UTI, will monitor.  - Endorsed fluid intake this am Pt with HARPREET this am, denies dysuria but does endorse frequency.  Pt now s/p ceftriaxone for tx for UTI, will monitor.  - Endorsed fluid intake this am  - Will bladder scan  - will consider fluid challenge vs lasix if pt does not increase output.

## 2018-05-13 NOTE — PROGRESS NOTE ADULT - PROBLEM SELECTOR PLAN 3
-Heparin gtt for AC, transition to oral agent for discharge  -appreciate Cards recs  - c/w heparin; tylenol prn pain control.

## 2018-05-14 DIAGNOSIS — F05 DELIRIUM DUE TO KNOWN PHYSIOLOGICAL CONDITION: ICD-10-CM

## 2018-05-14 LAB
ANION GAP SERPL CALC-SCNC: 15 MMOL/L — SIGNIFICANT CHANGE UP (ref 5–17)
APTT BLD: 42 SEC — HIGH (ref 27.5–37.4)
APTT BLD: 88.4 SEC — HIGH (ref 27.5–37.4)
BASOPHILS # BLD AUTO: 0.03 K/UL — SIGNIFICANT CHANGE UP (ref 0–0.2)
BASOPHILS NFR BLD AUTO: 0.4 % — SIGNIFICANT CHANGE UP (ref 0–2)
BUN SERPL-MCNC: 53 MG/DL — HIGH (ref 7–23)
CALCIUM SERPL-MCNC: 8.6 MG/DL — SIGNIFICANT CHANGE UP (ref 8.4–10.5)
CHLORIDE SERPL-SCNC: 102 MMOL/L — SIGNIFICANT CHANGE UP (ref 96–108)
CO2 SERPL-SCNC: 24 MMOL/L — SIGNIFICANT CHANGE UP (ref 22–31)
CREAT SERPL-MCNC: 1.48 MG/DL — HIGH (ref 0.5–1.3)
EOSINOPHIL # BLD AUTO: 0.39 K/UL — SIGNIFICANT CHANGE UP (ref 0–0.5)
EOSINOPHIL NFR BLD AUTO: 5.7 % — SIGNIFICANT CHANGE UP (ref 0–6)
GLUCOSE SERPL-MCNC: 121 MG/DL — HIGH (ref 70–99)
HCT VFR BLD CALC: 31.2 % — LOW (ref 34.5–45)
HGB BLD-MCNC: 10.3 G/DL — LOW (ref 11.5–15.5)
IMM GRANULOCYTES NFR BLD AUTO: 0 % — SIGNIFICANT CHANGE UP (ref 0–1.5)
LYMPHOCYTES # BLD AUTO: 0.64 K/UL — LOW (ref 1–3.3)
LYMPHOCYTES # BLD AUTO: 9.4 % — LOW (ref 13–44)
MAGNESIUM SERPL-MCNC: 2.5 MG/DL — SIGNIFICANT CHANGE UP (ref 1.6–2.6)
MCHC RBC-ENTMCNC: 30.7 PG — SIGNIFICANT CHANGE UP (ref 27–34)
MCHC RBC-ENTMCNC: 33 GM/DL — SIGNIFICANT CHANGE UP (ref 32–36)
MCV RBC AUTO: 92.9 FL — SIGNIFICANT CHANGE UP (ref 80–100)
MONOCYTES # BLD AUTO: 0.65 K/UL — SIGNIFICANT CHANGE UP (ref 0–0.9)
MONOCYTES NFR BLD AUTO: 9.5 % — SIGNIFICANT CHANGE UP (ref 2–14)
NEUTROPHILS # BLD AUTO: 5.1 K/UL — SIGNIFICANT CHANGE UP (ref 1.8–7.4)
NEUTROPHILS NFR BLD AUTO: 75 % — SIGNIFICANT CHANGE UP (ref 43–77)
PHOSPHATE SERPL-MCNC: 4.2 MG/DL — SIGNIFICANT CHANGE UP (ref 2.5–4.5)
PLATELET # BLD AUTO: 153 K/UL — SIGNIFICANT CHANGE UP (ref 150–400)
POTASSIUM SERPL-MCNC: 4 MMOL/L — SIGNIFICANT CHANGE UP (ref 3.5–5.3)
POTASSIUM SERPL-SCNC: 4 MMOL/L — SIGNIFICANT CHANGE UP (ref 3.5–5.3)
RBC # BLD: 3.36 M/UL — LOW (ref 3.8–5.2)
RBC # FLD: 14.3 % — SIGNIFICANT CHANGE UP (ref 10.3–14.5)
SODIUM SERPL-SCNC: 141 MMOL/L — SIGNIFICANT CHANGE UP (ref 135–145)
WBC # BLD: 6.81 K/UL — SIGNIFICANT CHANGE UP (ref 3.8–10.5)
WBC # FLD AUTO: 6.81 K/UL — SIGNIFICANT CHANGE UP (ref 3.8–10.5)

## 2018-05-14 PROCEDURE — 99232 SBSQ HOSP IP/OBS MODERATE 35: CPT

## 2018-05-14 PROCEDURE — 99233 SBSQ HOSP IP/OBS HIGH 50: CPT

## 2018-05-14 PROCEDURE — 99233 SBSQ HOSP IP/OBS HIGH 50: CPT | Mod: GC

## 2018-05-14 RX ORDER — VALPROIC ACID (AS SODIUM SALT) 250 MG/5ML
125 SOLUTION, ORAL ORAL EVERY 8 HOURS
Qty: 0 | Refills: 0 | Status: DISCONTINUED | OUTPATIENT
Start: 2018-05-14 | End: 2018-05-21

## 2018-05-14 RX ORDER — SODIUM CHLORIDE 9 MG/ML
250 INJECTION, SOLUTION INTRAVENOUS ONCE
Qty: 0 | Refills: 0 | Status: COMPLETED | OUTPATIENT
Start: 2018-05-14 | End: 2018-05-14

## 2018-05-14 RX ORDER — LANOLIN ALCOHOL/MO/W.PET/CERES
3 CREAM (GRAM) TOPICAL AT BEDTIME
Qty: 0 | Refills: 0 | Status: DISCONTINUED | OUTPATIENT
Start: 2018-05-14 | End: 2018-05-21

## 2018-05-14 RX ORDER — HALOPERIDOL DECANOATE 100 MG/ML
0.5 INJECTION INTRAMUSCULAR EVERY 6 HOURS
Qty: 0 | Refills: 0 | Status: DISCONTINUED | OUTPATIENT
Start: 2018-05-14 | End: 2018-05-14

## 2018-05-14 RX ORDER — QUETIAPINE FUMARATE 200 MG/1
25 TABLET, FILM COATED ORAL EVERY 6 HOURS
Qty: 0 | Refills: 0 | Status: DISCONTINUED | OUTPATIENT
Start: 2018-05-14 | End: 2018-05-14

## 2018-05-14 RX ADMIN — HEPARIN SODIUM 1300 UNIT(S)/HR: 5000 INJECTION INTRAVENOUS; SUBCUTANEOUS at 17:38

## 2018-05-14 RX ADMIN — CARVEDILOL PHOSPHATE 3.12 MILLIGRAM(S): 80 CAPSULE, EXTENDED RELEASE ORAL at 23:11

## 2018-05-14 RX ADMIN — HEPARIN SODIUM 2500 UNIT(S): 5000 INJECTION INTRAVENOUS; SUBCUTANEOUS at 10:59

## 2018-05-14 RX ADMIN — CARVEDILOL PHOSPHATE 3.12 MILLIGRAM(S): 80 CAPSULE, EXTENDED RELEASE ORAL at 11:06

## 2018-05-14 RX ADMIN — Medication 81 MILLIGRAM(S): at 11:06

## 2018-05-14 RX ADMIN — HEPARIN SODIUM 1300 UNIT(S)/HR: 5000 INJECTION INTRAVENOUS; SUBCUTANEOUS at 10:56

## 2018-05-14 RX ADMIN — SERTRALINE 200 MILLIGRAM(S): 25 TABLET, FILM COATED ORAL at 11:06

## 2018-05-14 RX ADMIN — SODIUM CHLORIDE 250 MILLILITER(S): 9 INJECTION, SOLUTION INTRAVENOUS at 14:15

## 2018-05-14 RX ADMIN — ATORVASTATIN CALCIUM 80 MILLIGRAM(S): 80 TABLET, FILM COATED ORAL at 23:11

## 2018-05-14 NOTE — PROGRESS NOTE ADULT - SUBJECTIVE AND OBJECTIVE BOX
Patient is a 89y old  Female who presents with a chief complaint of Pulmonary Embolism (11 May 2018 18:51)      Interval History:     MEDICATIONS  (STANDING):  aspirin enteric coated 81 milliGRAM(s) Oral daily  atorvastatin 80 milliGRAM(s) Oral at bedtime  carvedilol 3.125 milliGRAM(s) Oral every 12 hours  heparin  Infusion.  Unit(s)/Hr (11 mL/Hr) IV Continuous <Continuous>  sertraline 200 milliGRAM(s) Oral daily    MEDICATIONS  (PRN):  acetaminophen   Tablet. 650 milliGRAM(s) Oral every 6 hours PRN Mild and Moderate Pain (1 - 6)  heparin  Injectable 5000 Unit(s) IV Push every 6 hours PRN For aPTT less than 40  heparin  Injectable 2500 Unit(s) IV Push every 6 hours PRN For aPTT between 40 - 57      Objective    Vital Signs Last 24 Hrs  T(C): 36.4 (14 May 2018 04:38), Max: 36.8 (13 May 2018 21:14)  T(F): 97.5 (14 May 2018 04:38), Max: 98.3 (13 May 2018 21:14)  HR: 64 (14 May 2018 04:38) (64 - 77)  BP: 110/66 (14 May 2018 04:38) (100/60 - 113/60)  BP(mean): --  RR: 20 (14 May 2018 04:38) (18 - 20)  SpO2: 97% (14 May 2018 04:38) (96% - 97%)      CAPILLARY BLOOD GLUCOSE            05-13-18 @ 07:01  -  05-14-18 @ 07:00  --------------------------------------------------------  IN: 320 mL / OUT: 500 mL / NET: -180 mL        Appearance: Sitting in bed, NAD  HEENT:   Normal oral mucosa, PERRL, EOMI, anicteric sclera  Lymphatic: No lymphadenopathy  Cardiovascular: RRR, No JVD, No murmurs, gallops or rubs appreciated  Respiratory: Lungs clear to auscultation bilaterally, no wheezes, crackles appreciated  Gastrointestinal:  Soft, nondistended, nontender, +BS	  Skin: No rashes, eccymosis or cyanosis noted	  Neurologic: AOx3, CNII-XII grossly intact, motor and sensory function grossly intact  Extremities: Moving all extremities equally  Vascular: palpable dp, pt, femoral and radial pulses 2+ bilaterally  Psych:  Normal mood and affect, responds to questions appropriately      05-13    140  |  102  |  50<H>  ----------------------------<  98  4.0   |  23  |  1.47<H>  05-12    141  |  104  |  39<H>  ----------------------------<  118<H>  4.4   |  23  |  1.28    Ca    8.8      13 May 2018 06:26  Ca    8.9      12 May 2018 05:49  Phos  4.0     05-13  Mg     2.4     05-13        PTT - ( 13 May 2018 08:19 )  PTT:66.4 sec                                        10.3   5.39  )-----------( 136      ( 13 May 2018 08:24 )             30.7                         11.0   8.01  )-----------( 136      ( 12 May 2018 07:46 )             33.4                         11.4   6.73  )-----------( 115      ( 11 May 2018 07:56 )             34.5     CAPILLARY BLOOD GLUCOSE              Radiology & Imaging    Imaging Personally Reviewed:    Consultant Notes Reviewed Rachael Ashford MD, PhD  PGY-1| Internal Medicine  [phone] / [id number]  Team 1  ========================    Patient is a 89y old  Female who presents with a chief complaint of Pulmonary Embolism (11 May 2018 18:51)    Interval History: Pt examined at bedside this am.  No acute events overnight.  Denies f/c/n/v/d this am.  Spoke to son and pt last night re: rehab, pt is willing to try    MEDICATIONS  (STANDING):  aspirin enteric coated 81 milliGRAM(s) Oral daily  atorvastatin 80 milliGRAM(s) Oral at bedtime  carvedilol 3.125 milliGRAM(s) Oral every 12 hours  heparin  Infusion.  Unit(s)/Hr (11 mL/Hr) IV Continuous <Continuous>  sertraline 200 milliGRAM(s) Oral daily    MEDICATIONS  (PRN):  acetaminophen   Tablet. 650 milliGRAM(s) Oral every 6 hours PRN Mild and Moderate Pain (1 - 6)  heparin  Injectable 5000 Unit(s) IV Push every 6 hours PRN For aPTT less than 40  heparin  Injectable 2500 Unit(s) IV Push every 6 hours PRN For aPTT between 40 - 57      Objective    Vital Signs Last 24 Hrs  T(C): 36.4 (14 May 2018 04:38), Max: 36.8 (13 May 2018 21:14)  T(F): 97.5 (14 May 2018 04:38), Max: 98.3 (13 May 2018 21:14)  HR: 64 (14 May 2018 04:38) (64 - 77)  BP: 110/66 (14 May 2018 04:38) (100/60 - 113/60)  BP(mean): --  RR: 20 (14 May 2018 04:38) (18 - 20)  SpO2: 97% (14 May 2018 04:38) (96% - 97%)      CAPILLARY BLOOD GLUCOSE      05-13-18 @ 07:01  -  05-14-18 @ 07:00  --------------------------------------------------------  IN: 320 mL / OUT: 500 mL / NET: -180 mL        Appearance: Laying in bed, sleeping  Cardiovascular: RRR, No murmurs, gallops or rubs appreciated  Respiratory: Lungs clear to auscultation bilaterally, no wheezes, crackles appreciated  Gastrointestinal:  Soft, nondistended, nontender, +BS	  Neurologic: AOx3, CNII-XII grossly intact, motor and sensory function grossly intact  Extremities: Moving all extremities equally; no edema noted.  Psych:  Normal mood and affect, responds to questions appropriately      05-13    140  |  102  |  50<H>  ----------------------------<  98  4.0   |  23  |  1.47<H>  05-12    141  |  104  |  39<H>  ----------------------------<  118<H>  4.4   |  23  |  1.28    Ca    8.8      13 May 2018 06:26  Ca    8.9      12 May 2018 05:49  Phos  4.0     05-13  Mg     2.4     05-13        PTT - ( 13 May 2018 08:19 )  PTT:66.4 sec                                        10.3   5.39  )-----------( 136      ( 13 May 2018 08:24 )             30.7                         11.0   8.01  )-----------( 136      ( 12 May 2018 07:46 )             33.4                         11.4   6.73  )-----------( 115      ( 11 May 2018 07:56 )             34.5     CAPILLARY BLOOD GLUCOSE              Radiology & Imaging    Imaging Personally Reviewed:    Consultant Notes Reviewed: appreciate psych input

## 2018-05-14 NOTE — PROGRESS NOTE BEHAVIORAL HEALTH - NSBHFUPINTERVALHXFT_PSY_A_CORE
Code nando called for agitation; psych f/u requested.  Pt currently AOx1-2, off on day of the week, date, gave the wrong hospital name.  States she was upset that the nurses don't smile at her, don't treat her respectfully. 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  Per primary team, was disrobing earlier, has been agitated and verbally abusive towards staff throughout the day. Pt denies A/H, denies paranoia but states "I don't want to call it a conspiracy, but the nurses tell each other about me after they get their feelings hurt."

## 2018-05-14 NOTE — PROGRESS NOTE ADULT - PROBLEM SELECTOR PLAN 3
-Heparin gtt for AC, transition to oral agent for discharge; will not do lovenox considering HARPREET  -appreciate Cards recs  - c/w heparin; tylenol prn pain control.

## 2018-05-14 NOTE — PROGRESS NOTE ADULT - PROBLEM SELECTOR PLAN 1
Pt with HARPREET this am, likely from contrast induced nephropathy from CTA.  FeNa and FeUrea show prerenal etiology.  - will monitor for resolution in creatinine.  - Will bladder scan  - will consider fluid challenge vs lasix if pt does not increase output.

## 2018-05-14 NOTE — CHART NOTE - NSCHARTNOTEFT_GEN_A_CORE
Code nando was called for patient.  Patient was screaming at nursing and cursing.  Per staff, patient had previously had outbursts of inappropriate behavior.  Spoke to psychiatry who will see patient.  Will place prn per psych note for haldol and seroquel.  Spoke to patient about behavior as well with good response.

## 2018-05-14 NOTE — PROGRESS NOTE ADULT - SUBJECTIVE AND OBJECTIVE BOX
PAGER:  [phone]               [facility] [id number]              EMAIL [email or web address]   OFFICE [phone]                              ********VASCULAR MEDICINE & CARDIOLOGY PROGRESS NOTE********                            CC:  PE    INTERVAL HISTORY:  Complains of some SOB.  No CP or palpitations.  she remains on heparin.        HISTORY OF PRESENT ILLNESS:  HPI:  89yo F with PMH of HFrEF (EF ~45%), CAD/MI (2005, s/p PCI), Severe AS, h/o DVT (2014, no longer on Coumadin), s/p PPM ([employer]), and HLD presenting with L-sided chest discomfort and associated SOB. Patient reports L-sided "tingling" discomfort, worse with palpation, difficult to say if worse with inspiration. She has had breathing issues for several days as well. She denies fever, chills, Abd Pain, N/V/D, leg swelling, worsening orthopnea. Patient she has become very sedentary in her daily routine, only getting up to eat and use the bathroom. No flights or long car rides.     In the ED, patient was afebrile, with BP 90s-100s, satting 95-97% on RA. She was given CTX x1, CTA Chest, Cards consult, and started on Heparin gtt. (10 May 2018 02:29)          Allergies    latex (Blisters)  lisinopril (Angioedema)  penicillins (Unknown)  shellfish (Unknown)  sulfa drugs (Unknown)    Intolerances    	    MEDICATIONS:  aspirin enteric coated 81 milliGRAM(s) Oral daily  carvedilol 3.125 milliGRAM(s) Oral every 12 hours  heparin  Infusion.  Unit(s)/Hr IV Continuous <Continuous>  heparin  Injectable 5000 Unit(s) IV Push every 6 hours PRN  heparin  Injectable 2500 Unit(s) IV Push every 6 hours PRN        acetaminophen   Tablet. 650 milliGRAM(s) Oral every 6 hours PRN  sertraline 200 milliGRAM(s) Oral daily      atorvastatin 80 milliGRAM(s) Oral at bedtime        PAST MEDICAL & SURGICAL HISTORY:  Depression  High cholesterol  DVT (deep venous thrombosis)  Myocardial infarction: 2005  CAD (coronary artery disease)  S/P hemorrhoidectomy      FAMILY HISTORY:  No pertinent family history in first degree relatives      SOCIAL HISTORY:  unchanged    REVIEW OF SYSTEMS:  CONSTITUTIONAL: No fever, weight loss, or fatigue  EYES: No eye pain, visual disturbances, or discharge  ENMT:  No difficulty hearing, tinnitus, vertigo; No sinus or throat pain  NECK: No pain or stiffness  RESPIRATORY: No cough, wheezing, chills or hemoptysis; No Shortness of Breath  CARDIOVASCULAR: No chest pain, palpitations, passing out, dizziness, or leg swelling  GASTROINTESTINAL: No abdominal or epigastric pain. No nausea, vomiting, or hematemesis; No diarrhea or constipation. No melena or hematochezia.  GENITOURINARY: No dysuria, frequency, hematuria, or incontinence  NEUROLOGICAL: No headaches, memory loss, loss of strength, numbness, or tremors  SKIN: No itching, burning, rashes, or lesions   LYMPH Nodes: No enlarged glands  ENDOCRINE: No heat or cold intolerance; No hair loss  MUSCULOSKELETAL: No joint pain or swelling; No muscle, back, or extremity pain  PSYCHIATRIC: No depression, anxiety, mood swings, or difficulty sleeping  HEME/LYMPH: No easy bruising, or bleeding gums  ALLERY AND IMMUNOLOGIC: No hives or eczema	    [ x] All others negative	  [ ] Unable to obtain    PHYSICAL EXAM:  T(C): 36.2 (05-14-18 @ 14:00), Max: 36.8 (05-13-18 @ 21:14)  HR: 64 (05-14-18 @ 14:00) (64 - 77)  BP: 93/55 (05-14-18 @ 14:00) (93/55 - 117/65)  RR: 16 (05-14-18 @ 14:00) (16 - 20)  SpO2: 99% (05-14-18 @ 14:00) (96% - 99%)  Wt(kg): --  I&O's Summary    13 May 2018 07:01  -  14 May 2018 07:00  --------------------------------------------------------  IN: 320 mL / OUT: 500 mL / NET: -180 mL    14 May 2018 07:01  -  14 May 2018 15:05  --------------------------------------------------------  IN: 578 mL / OUT: 200 mL / NET: 378 mL        Appearance: Normal	  HEENT:   Normal oral mucosa, PERRL, EOMI	  Lymphatic: No lymphadenopathy  Cardiovascular: Normal S1 S2m, + ANNIA  Respiratory: Lungs clear to auscultation	  Psychiatry: A & O x 3, Mood & affect appropriate  Gastrointestinal:  Soft, Non-tender, + BS	  Skin: No rashes, No ecchymoses, No cyanosis	  Neurologic: Non-focal  Extremities: Normal range of motion, No clubbing, cyanosis or edema  Vascular: Peripheral pulses palpable 2+ bilaterally      LABS:	 	    CBC Full  -  ( 14 May 2018 09:45 )  WBC Count : 6.81 K/uL  Hemoglobin : 10.3 g/dL  Hematocrit : 31.2 %  Platelet Count - Automated : 153 K/uL  Mean Cell Volume : 92.9 fl  Mean Cell Hemoglobin : 30.7 pg  Mean Cell Hemoglobin Concentration : 33.0 gm/dL  Auto Neutrophil # : 5.10 K/uL  Auto Lymphocyte # : 0.64 K/uL  Auto Monocyte # : 0.65 K/uL  Auto Eosinophil # : 0.39 K/uL  Auto Basophil # : 0.03 K/uL  Auto Neutrophil % : 75.0 %  Auto Lymphocyte % : 9.4 %  Auto Monocyte % : 9.5 %  Auto Eosinophil % : 5.7 %  Auto Basophil % : 0.4 %    05-14    141  |  102  |  53<H>  ----------------------------<  121<H>  4.0   |  24  |  1.48<H>  05-13    140  |  102  |  50<H>  ----------------------------<  98  4.0   |  23  |  1.47<H>    Ca    8.6      14 May 2018 06:48  Ca    8.8      13 May 2018 06:26  Phos  4.2     05-14  Phos  4.0     05-13  Mg     2.5     05-14  Mg     2.4     05-13

## 2018-05-14 NOTE — PROGRESS NOTE ADULT - ASSESSMENT
89yo F with PMH of HFrEF (EF ~45%), CAD/MI (2005, s/p PCI), Severe AS, h/o DVT (2014, no longer on Coumadin), s/p PPM ([employer]), and HLD p/w CP/SOB found to have CT evidence of submassive R-sided PE w/ elevated BNP, now on hep gtt improving. Currently in no pain; pt now with HARPREET this am.

## 2018-05-14 NOTE — PROGRESS NOTE ADULT - ASSESSMENT
1.  Pulmonary Embolism  2.  Moderate AS  3.  Reduced Ejection Fraction  4.  Atheroslerosis      Plan:  1.  She has HARPREET, continue heparin gtt for now  2.  If renal function improves, then will consider transition to a DOAC such as Eliquis 5mg BID  3.  Continue with statin therapy  4.  Encourage PO oral hydration.     Will follow with you    Dionte Lynch  [id number]

## 2018-05-14 NOTE — PROGRESS NOTE ADULT - ATTENDING COMMENTS
Events noted. intermittent agitation.  continue to monitor mental state   check CT head, RPR, B12  continue heparin gtt and monitor PTT.   Psych note appreciated. monitor QTc

## 2018-05-15 DIAGNOSIS — R45.1 RESTLESSNESS AND AGITATION: ICD-10-CM

## 2018-05-15 LAB
ANION GAP SERPL CALC-SCNC: 11 MMOL/L — SIGNIFICANT CHANGE UP (ref 5–17)
APTT BLD: 100.5 SEC — HIGH (ref 27.5–37.4)
APTT BLD: 105.8 SEC — HIGH (ref 27.5–37.4)
APTT BLD: 74.9 SEC — HIGH (ref 27.5–37.4)
APTT BLD: 84.3 SEC — HIGH (ref 27.5–37.4)
BUN SERPL-MCNC: 54 MG/DL — HIGH (ref 7–23)
CALCIUM SERPL-MCNC: 8.8 MG/DL — SIGNIFICANT CHANGE UP (ref 8.4–10.5)
CHLORIDE SERPL-SCNC: 103 MMOL/L — SIGNIFICANT CHANGE UP (ref 96–108)
CO2 SERPL-SCNC: 27 MMOL/L — SIGNIFICANT CHANGE UP (ref 22–31)
CREAT SERPL-MCNC: 1.42 MG/DL — HIGH (ref 0.5–1.3)
FOLATE SERPL-MCNC: 17.1 NG/ML — SIGNIFICANT CHANGE UP
GLUCOSE SERPL-MCNC: 128 MG/DL — HIGH (ref 70–99)
HCT VFR BLD CALC: 31.8 % — LOW (ref 34.5–45)
HCT VFR BLD CALC: 32 % — LOW (ref 34.5–45)
HGB BLD-MCNC: 10.1 G/DL — LOW (ref 11.5–15.5)
HGB BLD-MCNC: 10.5 G/DL — LOW (ref 11.5–15.5)
MAGNESIUM SERPL-MCNC: 2.5 MG/DL — SIGNIFICANT CHANGE UP (ref 1.6–2.6)
MCHC RBC-ENTMCNC: 30.2 PG — SIGNIFICANT CHANGE UP (ref 27–34)
MCHC RBC-ENTMCNC: 31.6 GM/DL — LOW (ref 32–36)
MCHC RBC-ENTMCNC: 31.7 PG — SIGNIFICANT CHANGE UP (ref 27–34)
MCHC RBC-ENTMCNC: 33.1 GM/DL — SIGNIFICANT CHANGE UP (ref 32–36)
MCV RBC AUTO: 95.6 FL — SIGNIFICANT CHANGE UP (ref 80–100)
MCV RBC AUTO: 95.8 FL — SIGNIFICANT CHANGE UP (ref 80–100)
PHOSPHATE SERPL-MCNC: 4.1 MG/DL — SIGNIFICANT CHANGE UP (ref 2.5–4.5)
PLATELET # BLD AUTO: 148 K/UL — LOW (ref 150–400)
PLATELET # BLD AUTO: 154 K/UL — SIGNIFICANT CHANGE UP (ref 150–400)
POTASSIUM SERPL-MCNC: 4.2 MMOL/L — SIGNIFICANT CHANGE UP (ref 3.5–5.3)
POTASSIUM SERPL-SCNC: 4.2 MMOL/L — SIGNIFICANT CHANGE UP (ref 3.5–5.3)
RBC # BLD: 3.33 M/UL — LOW (ref 3.8–5.2)
RBC # BLD: 3.34 M/UL — LOW (ref 3.8–5.2)
RBC # FLD: 12.9 % — SIGNIFICANT CHANGE UP (ref 10.3–14.5)
RBC # FLD: 14.3 % — SIGNIFICANT CHANGE UP (ref 10.3–14.5)
SODIUM SERPL-SCNC: 141 MMOL/L — SIGNIFICANT CHANGE UP (ref 135–145)
VIT B12 SERPL-MCNC: 683 PG/ML — SIGNIFICANT CHANGE UP (ref 232–1245)
WBC # BLD: 8.05 K/UL — SIGNIFICANT CHANGE UP (ref 3.8–10.5)
WBC # BLD: 9.1 K/UL — SIGNIFICANT CHANGE UP (ref 3.8–10.5)
WBC # FLD AUTO: 8.05 K/UL — SIGNIFICANT CHANGE UP (ref 3.8–10.5)
WBC # FLD AUTO: 9.1 K/UL — SIGNIFICANT CHANGE UP (ref 3.8–10.5)

## 2018-05-15 PROCEDURE — 99232 SBSQ HOSP IP/OBS MODERATE 35: CPT

## 2018-05-15 PROCEDURE — 70450 CT HEAD/BRAIN W/O DYE: CPT | Mod: 26

## 2018-05-15 PROCEDURE — 99233 SBSQ HOSP IP/OBS HIGH 50: CPT | Mod: GC

## 2018-05-15 RX ORDER — SODIUM CHLORIDE 9 MG/ML
1000 INJECTION INTRAMUSCULAR; INTRAVENOUS; SUBCUTANEOUS
Qty: 0 | Refills: 0 | Status: DISCONTINUED | OUTPATIENT
Start: 2018-05-15 | End: 2018-05-17

## 2018-05-15 RX ADMIN — CARVEDILOL PHOSPHATE 3.12 MILLIGRAM(S): 80 CAPSULE, EXTENDED RELEASE ORAL at 23:06

## 2018-05-15 RX ADMIN — HEPARIN SODIUM 1200 UNIT(S)/HR: 5000 INJECTION INTRAVENOUS; SUBCUTANEOUS at 00:23

## 2018-05-15 RX ADMIN — HEPARIN SODIUM 1100 UNIT(S)/HR: 5000 INJECTION INTRAVENOUS; SUBCUTANEOUS at 07:38

## 2018-05-15 RX ADMIN — HEPARIN SODIUM 1100 UNIT(S)/HR: 5000 INJECTION INTRAVENOUS; SUBCUTANEOUS at 16:36

## 2018-05-15 RX ADMIN — Medication 81 MILLIGRAM(S): at 12:12

## 2018-05-15 RX ADMIN — CARVEDILOL PHOSPHATE 3.12 MILLIGRAM(S): 80 CAPSULE, EXTENDED RELEASE ORAL at 13:34

## 2018-05-15 RX ADMIN — ATORVASTATIN CALCIUM 80 MILLIGRAM(S): 80 TABLET, FILM COATED ORAL at 21:16

## 2018-05-15 RX ADMIN — SODIUM CHLORIDE 50 MILLILITER(S): 9 INJECTION INTRAMUSCULAR; INTRAVENOUS; SUBCUTANEOUS at 12:12

## 2018-05-15 RX ADMIN — Medication 3 MILLIGRAM(S): at 21:17

## 2018-05-15 RX ADMIN — SERTRALINE 200 MILLIGRAM(S): 25 TABLET, FILM COATED ORAL at 12:12

## 2018-05-15 NOTE — PROGRESS NOTE ADULT - SUBJECTIVE AND OBJECTIVE BOX
PAGER:  [phone]               [facility] [id number]              EMAIL [email or web address]   OFFICE [phone]                              ********VASCULAR MEDICINE & CARDIOLOGY PROGRESS NOTE********                            CC:  PE    INTERVAL HISTORY:  Complains of some SOB.  No CP or palpitations.  she remains on heparin.  Epistaxis earlier that resolved.      MEDICATIONS  (STANDING):  aspirin enteric coated 81 milliGRAM(s) Oral daily  atorvastatin 80 milliGRAM(s) Oral at bedtime  carvedilol 3.125 milliGRAM(s) Oral every 12 hours  heparin  Infusion.  Unit(s)/Hr (11 mL/Hr) IV Continuous <Continuous>  sertraline 200 milliGRAM(s) Oral daily  sodium chloride 0.9%. 1000 milliLiter(s) (50 mL/Hr) IV Continuous <Continuous>    MEDICATIONS  (PRN):  acetaminophen   Tablet. 650 milliGRAM(s) Oral every 6 hours PRN Mild and Moderate Pain (1 - 6)  heparin  Injectable 5000 Unit(s) IV Push every 6 hours PRN For aPTT less than 40  heparin  Injectable 2500 Unit(s) IV Push every 6 hours PRN For aPTT between 40 - 57  LORazepam   Injectable 0.25 milliGRAM(s) IV Push every 6 hours PRN severe agitation  melatonin 3 milliGRAM(s) Oral at bedtime PRN Insomnia  valproate sodium IVPB 125 milliGRAM(s) IV Intermittent every 8 hours PRN Agitation      PAST MEDICAL & SURGICAL HISTORY:  Depression  High cholesterol  DVT (deep venous thrombosis)  Myocardial infarction: 2005  CAD (coronary artery disease)  S/P hemorrhoidectomy      FAMILY HISTORY:  No pertinent family history in first degree relatives      SOCIAL HISTORY:  unchanged    REVIEW OF SYSTEMS:  CONSTITUTIONAL: No fever, weight loss, or fatigue  EYES: No eye pain, visual disturbances, or discharge  ENMT:  No difficulty hearing, tinnitus, vertigo; No sinus or throat pain  NECK: No pain or stiffness  RESPIRATORY: No cough, wheezing, chills or hemoptysis; No Shortness of Breath  CARDIOVASCULAR: No chest pain, palpitations, passing out, dizziness, or leg swelling  GASTROINTESTINAL: No abdominal or epigastric pain. No nausea, vomiting, or hematemesis; No diarrhea or constipation. No melena or hematochezia.  GENITOURINARY: No dysuria, frequency, hematuria, or incontinence  NEUROLOGICAL: No headaches, memory loss, loss of strength, numbness, or tremors  SKIN: No itching, burning, rashes, or lesions   LYMPH Nodes: No enlarged glands  ENDOCRINE: No heat or cold intolerance; No hair loss  MUSCULOSKELETAL: No joint pain or swelling; No muscle, back, or extremity pain  PSYCHIATRIC: No depression, anxiety, mood swings, or difficulty sleeping  HEME/LYMPH: No easy bruising, or bleeding gums  ALLERY AND IMMUNOLOGIC: No hives or eczema	    [ x] All others negative	  [ ] Unable to obtain    ICU Vital Signs Last 24 Hrs  T(C): 36.6 (15 May 2018 14:03), Max: 36.7 (15 May 2018 04:33)  T(F): 97.8 (15 May 2018 14:03), Max: 98 (15 May 2018 04:33)  HR: 76 (15 May 2018 14:03) (60 - 76)  BP: 110/67 (15 May 2018 14:03) (91/50 - 115/60)  BP(mean): --  ABP: --  ABP(mean): --  RR: 20 (15 May 2018 14:03) (18 - 22)  SpO2: 97% (15 May 2018 14:03) (95% - 98%)          Appearance: Normal	  HEENT:   Normal oral mucosa, PERRL, EOMI	  Lymphatic: No lymphadenopathy  Cardiovascular: Normal S1 S2m, + ANNIA  Respiratory: Lungs clear to auscultation	  Psychiatry: A & O x 3, Mood & affect appropriate  Gastrointestinal:  Soft, Non-tender, + BS	  Skin: No rashes, No ecchymoses, No cyanosis	  Neurologic: Non-focal  Extremities: Normal range of motion, No clubbing, cyanosis or edema  Vascular: Peripheral pulses palpable 2+ bilaterally                           10.5   9.1   )-----------( 148      ( 15 May 2018 15:32 )             31.8   05-15    141  |  103  |  54<H>  ----------------------------<  128<H>  4.2   |  27  |  1.42<H>    Ca    8.8      15 May 2018 06:11  Phos  4.1     05-15  Mg     2.5     05-15

## 2018-05-15 NOTE — PROGRESS NOTE ADULT - PROBLEM SELECTOR PLAN 3
Pt with history of depression, now on sertraline.   - Will need further eval prior to discharge  - c/w sertraline

## 2018-05-15 NOTE — PROGRESS NOTE ADULT - ATTENDING COMMENTS
More calm and coherent today.  CT head noted without acute pathology.  HARPREET- monitor creatinine and rule out contrast induced cardiomyopathy. gentle IVF   PE - continue oxygen, continue heparin gtt and monitor PTT   once creatinine is improved will consider starting on NOAC.

## 2018-05-15 NOTE — PROGRESS NOTE ADULT - PROBLEM SELECTOR PLAN 2
Pt with agitation yesterday, seen by psych with prns placed.  Pt did not need prns overnight.  - Agitation likely secondary to stress of current disease  - Pt expresses understanding about behaviour in hospital, joking with neighbor today.  - Per psych, will need eval prior to dc.  - CT head done today, neg; B12 and folate wnl.

## 2018-05-15 NOTE — PROGRESS NOTE BEHAVIORAL HEALTH - SUMMARY
89yo woman hospitalized for PE, noted to have anhedonia and depressed mood on sertraline 200mg daily. On encounter, Ms Quinteros endorses poor mood and frustration with health, and exhibits an unusually elevated self-regard and difficulty with doctors' boundaries. Moreover, she exhibits widely tangential thought process and difficulty recalling circumstances of her hospitalization, concerning for cognitive impairment.   Intermittently with verbal agitation towards staff.  Son states pt is at baseline, denies concerns for suicidality or homicidality, does state pt has some short term memory loss.  Suspect MCI vs. early dementia.  Of note, pt with QTc prolonged.
89yo woman hospitalized for PE, noted to have anhedonia and depressed mood on sertraline 200mg daily. On encounter, Ms Quinteros endorses poor mood and frustration with health, and exhibits an unusually elevated self-regard and difficulty with doctors' boundaries that seem narcissistic in nature. Moreover, she exhibits widely tangential thought process and difficulty recalling circumstances of her hospitalization, concerning for cognitive impairment. Behaviorally calm and endorses no suicide risk.     As stated on first assessment, further collateral is indicated, but Ms Quinteros is reluctant to engage with psychiatry team and so far has not granted permission. MMSE/MoCA useful as well, but require her cooperation. No concern for suicidality from this encounter, enhanced obs not needed.
89yo woman hospitalized for PE, noted to have anhedonia and depressed mood on sertraline 200mg daily. On encounter, Ms Quinteros endorses poor mood and frustration with health, and exhibits an unusually elevated self-regard and difficulty with doctors' boundaries. Moreover, she exhibits widely tangential thought process and difficulty recalling circumstances of her hospitalization, concerning for cognitive impairment.  Agitated today but was able to calm down with verbal redirection, ?hypersexual (was disrobing, has asked staff to take a picture of her genitals), and was verbally abusive to staff.  QTc prolonged.

## 2018-05-15 NOTE — PROGRESS NOTE ADULT - PROBLEM SELECTOR PLAN 4
-Heparin gtt for AC, transition to oral agent for discharge; will not do lovenox considering HARPREET, will do NOAC  -appreciate Cards recs  - c/w heparin; tylenol prn pain control.

## 2018-05-15 NOTE — PROGRESS NOTE BEHAVIORAL HEALTH - NSBHCHARTREVIEWVS_PSY_A_CORE FT
Vital Signs - Last 24 Hrs    T(C): 36.7 (18 @ 05:08), Max: 36.9 (18 @ 19:16)  HR: 75 (18 @ 05:08) (75 - 85)  BP: 106/66 (18 @ 05:08) (106/66 - 120/65)  RR: 18 (18 @ 05:08) (17 - 18)  SpO2: 95% (18 @ 05:08) (93% - 96%)  Wt(kg): --  Daily Height in cm: 167.64 (11 May 2018 21:15)    Daily Weight in k.1 (11 May 2018 18:51)    I&O's Summary    11 May 2018 07:01  -  12 May 2018 07:00  --------------------------------------------------------  IN: 584 mL / OUT: 250 mL / NET: 334 mL
T(C): 36.2 (05-14-18 @ 14:00), Max: 36.8 (05-13-18 @ 21:14)  HR: 64 (05-14-18 @ 14:00) (64 - 77)  BP: 93/55 (05-14-18 @ 14:00) (93/55 - 117/65)  RR: 16 (05-14-18 @ 14:00) (16 - 20)  SpO2: 99% (05-14-18 @ 14:00) (96% - 99%)  Wt(kg): --
T(C): 36.5 (05-15-18 @ 12:15), Max: 36.7 (05-15-18 @ 04:33)  HR: 76 (05-15-18 @ 14:03) (60 - 76)  BP: 110/67 (05-15-18 @ 14:03) (91/50 - 115/60)  RR: 20 (05-15-18 @ 12:15) (18 - 22)  SpO2: 96% (05-15-18 @ 12:15) (95% - 98%)  Wt(kg): --

## 2018-05-15 NOTE — PROGRESS NOTE ADULT - ASSESSMENT
89yo F with PMH of HFrEF (EF ~45%), CAD/MI (2005, s/p PCI), Severe AS, h/o DVT (2014, no longer on Coumadin), s/p PPM ([employer]), and HLD p/w CP/SOB found to have CT evidence of submassive R-sided PE w/ elevated BNP, now on hep gtt improving. Currently in no pain; pt now with HARPREET resolving. 89 Y/O/ F with PMH of HFrEF (EF ~45%), CAD/MI (2005, s/p PCI), Severe AS, h/o DVT (2014, no longer on Coumadin), s/p PPM ([employer]), and HLD p/w CP/SOB found to have CT evidence of submassive R-sided PE w/ elevated BNP, now on hep gtt improving. Currently in no pain; pt now with HARPREET resolving.

## 2018-05-15 NOTE — PROGRESS NOTE ADULT - PROBLEM SELECTOR PLAN 1
Pt with HARPREET this am, likely from contrast induced nephropathy from CTA.    - will monitor for resolution in creatinine, now improving slightly.  - NS @ 50 ml/hr x 12 hrs

## 2018-05-15 NOTE — PROGRESS NOTE BEHAVIORAL HEALTH - NSBHCHARTREVIEWIMAGING_PSY_A_CORE FT
< from: CT Head No Cont (05.15.18 @ 09:43) >      CLINICAL INFORMATION: Patient altered, agitated, please evaluate    TECHNIQUE: Multiple axial CT images of the head were obtained without   contrast. Sagittal and coronal reconstructed images were acquired from   the source data.    COMPARISON: Prior head CT examination dated 2/20/2014.    FINDINGS: There is no acute intracranial hemorrhage, mass effect, shift   of the midline structures, herniation, extra-axial fluid collection, or   hydrocephalus.    There is diffuse cerebral volume loss with prominence of the sulci,   fissures, and cisternal spaces which is normal for the patient's age.   There is mild deep and periventricular white matter hypoattenuation   statistically compatible with microvascular changes given calcific   atherosclerotic disease of the intracranial arteries.    The paranasal sinuses and mastoid air cells are clear. The calvarium is   intact. There is evidence ofbilateral cataract removal.    IMPRESSION: No acute intracranial hemorrhage, mass effect, or shift of   the midline structures.    Unchanged microvascular disease.    < end of copied text >

## 2018-05-15 NOTE — PROGRESS NOTE BEHAVIORAL HEALTH - NSBHCONSULTMEDS_PSY_A_CORE FT
1. C/w home dose Zoloft    2. PRN: Depacon 125mg IV q8h PRN agitation.  Ativan 0.25mg PO/IV q6h PRN severe agitation (advise judicious use as may worsen mental status).  Melatonin 3mg PO qHS PRN insomnia.    3. Minimize use of benzos, opioids, anticholinergics, or other deliriogenic agents when possible.  Maintain sleep wake cycle.  Provide frequent reorientation and redirection.  Family member at bedside if possible. Assess for need for glasses and hearing aid (if applicable).    4. OP psych f/u at [facility]- [phone]
1. C/w home dose Zoloft    2. PRN: Depacon 125mg IV q8h PRN agitation.  Ativan 0.25mg PO/IV q6h PRN severe agitation (advise judicious use as may worsen mental status).  Melatonin 3mg PO qHS PRN insomnia.    3. Check: TSH, B12, folate, RPR; consider CT head    4. Minimize use of benzos, opioids, anticholinergics, or other deliriogenic agents when possible.  Maintain sleep wake cycle.  Provide frequent reorientation and redirection.  Family member at bedside if possible. Assess for need for glasses and hearing aid (if applicable).    5. CL psych will continue to follow
Continue Zoloft 200 mg QD    Consider PRN: Seroquel 25mg PO q6h PRN agitation; Haldol 0.5mg IV q6h PRN severe agitation.  Monitor for QTc<500.    F/u B12, folate, RPR, UA, U-tox; consider CT head    Minimize use of benzos, opioids, anticholinergics, or other deliriogenic agents when possible.  Maintain sleep wake cycle.  Provide frequent reorientation and redirection.  Family member at bedside if possible. Assess for need for glasses and hearing aid (if applicable).    Not a candidate for inpatient hospitalization, can f/u at [facility] after d/c- [phone].

## 2018-05-15 NOTE — PROGRESS NOTE BEHAVIORAL HEALTH - NSBHATTESTSEENBY_PSY_A_CORE
attending Psychiatrist without NP/Trainee
attending Psychiatrist without NP/Trainee
Attending Psychiatrist supervising NP/Trainee, meeting pt...

## 2018-05-15 NOTE — PROGRESS NOTE BEHAVIORAL HEALTH - RISK ASSESSMENT
low there is no suicidal ideation or plans, no CAH, she is future oriented, no hopelessness, no loss of interest.
low there is no suicidal ideation or plans, no CAH, she is future oriented, no hopelessness, no loss of interest
Elevated risk 2/2 agitation

## 2018-05-15 NOTE — PROGRESS NOTE BEHAVIORAL HEALTH - NSBHCHARTREVIEWINVESTIGATE_PSY_A_CORE FT
< from: 12 Lead ECG (05.10.18 @ 16:07) >      Ventricular Rate 87 BPM    Atrial Rate 87 BPM    P-R Interval 208 ms    QRS Duration 208 ms     ms    QTc 608 ms    P Axis -6 degrees    R Axis -59 degrees    T Axis 110 degrees    Diagnosis Line ELECTRONIC VENTRICULAR PACEMAKER    < end of copied text >
< from: 12 Lead ECG (05.10.18 @ 16:07) >      Ventricular Rate 87 BPM    Atrial Rate 87 BPM    P-R Interval 208 ms    QRS Duration 208 ms     ms    QTc 608 ms    P Axis -6 degrees    R Axis -59 degrees    T Axis 110 degrees    Diagnosis Line ELECTRONIC VENTRICULAR PACEMAKER    < end of copied text >

## 2018-05-15 NOTE — PROGRESS NOTE ADULT - SUBJECTIVE AND OBJECTIVE BOX
Rachael sAhford MD, PhD  PGY-1| Internal Medicine  [phone] / [phone]  Team 1  =============================    Patient is a 89y old  Female who presents with a chief complaint of Pulmonary Embolism (11 May 2018 18:51)    Interval History: Pt examined at bedside this am.  Denies f/c/n/v/d.  Pt had code gray overnight for agitation, no events of agitation overnight.     MEDICATIONS  (STANDING):  aspirin enteric coated 81 milliGRAM(s) Oral daily  atorvastatin 80 milliGRAM(s) Oral at bedtime  carvedilol 3.125 milliGRAM(s) Oral every 12 hours  heparin  Infusion.  Unit(s)/Hr (11 mL/Hr) IV Continuous <Continuous>  sertraline 200 milliGRAM(s) Oral daily    MEDICATIONS  (PRN):  acetaminophen   Tablet. 650 milliGRAM(s) Oral every 6 hours PRN Mild and Moderate Pain (1 - 6)  heparin  Injectable 5000 Unit(s) IV Push every 6 hours PRN For aPTT less than 40  heparin  Injectable 2500 Unit(s) IV Push every 6 hours PRN For aPTT between 40 - 57  LORazepam   Injectable 0.25 milliGRAM(s) IV Push every 6 hours PRN severe agitation  melatonin 3 milliGRAM(s) Oral at bedtime PRN Insomnia  valproate sodium IVPB 125 milliGRAM(s) IV Intermittent every 8 hours PRN Agitation      Objective    Vital Signs Last 24 Hrs  T(C): 36.7 (15 May 2018 04:33), Max: 36.7 (15 May 2018 04:33)  T(F): 98 (15 May 2018 04:33), Max: 98 (15 May 2018 04:33)  HR: 60 (15 May 2018 04:33) (60 - 75)  BP: 105/63 (15 May 2018 04:33) (93/55 - 115/60)  BP(mean): --  RR: 22 (15 May 2018 04:33) (16 - 22)  SpO2: 98% (15 May 2018 04:33) (95% - 99%)      CAPILLARY BLOOD GLUCOSE            05-14-18 @ 07:01  -  05-15-18 @ 07:00  --------------------------------------------------------  IN: 905 mL / OUT: 600 mL / NET: 305 mL        Appearance: Sitting in bed, NAD  Cardiovascular: RRR, No murmurs, gallops or rubs appreciated  Respiratory: Lungs clear to auscultation bilaterally, no wheezes, crackles appreciated  Gastrointestinal:  Soft, nondistended, nontender, +BS	  Skin: No rashes noted  Neurologic: AOx3, CNII-XII grossly intact, motor and sensory function grossly intact  Extremities: Moving all extremities equally  Vascular: No LE edema noted.  Psych:  Normal mood and affect, responds to questions appropriately; explains understanding that pt cannot be inappropriate with staff today.      05-15    141  |  103  |  54<H>  ----------------------------<  128<H>  4.2   |  27  |  1.42<H>  05-14    141  |  102  |  53<H>  ----------------------------<  121<H>  4.0   |  24  |  1.48<H>  05-13    140  |  102  |  50<H>  ----------------------------<  98  4.0   |  23  |  1.47<H>    Ca    8.8      15 May 2018 06:11  Ca    8.6      14 May 2018 06:48  Ca    8.8      13 May 2018 06:26  Phos  4.1     05-15  Mg     2.5     05-15        PTT - ( 15 May 2018 06:11 )  PTT:105.8 sec                                        10.1   8.05  )-----------( 154      ( 15 May 2018 07:38 )             32.0                         10.3   6.81  )-----------( 153      ( 14 May 2018 09:45 )             31.2                         10.3   5.39  )-----------( 136      ( 13 May 2018 08:24 )             30.7     CAPILLARY BLOOD GLUCOSE              Radiology & Imaging    Imaging Personally Reviewed:    Consultant Notes Reviewed

## 2018-05-15 NOTE — PROGRESS NOTE ADULT - ASSESSMENT
1.  Pulmonary Embolism  2.  Moderate AS  3.  Reduced Ejection Fraction  4.  Atheroslerosis      Plan:  1.  She has HARPREET, continue heparin gtt for now  2.  If renal function improves, then will consider transition to a DOAC such as Eliquis 5mg BID  3.  Continue with statin therapy  4.  Encourage PO oral hydration.   5.  BP and HR well controlled.     Will follow with you    Dionte Lynch  [id number]

## 2018-05-15 NOTE — PROGRESS NOTE BEHAVIORAL HEALTH - NSBHFUPINTERVALHXFT_PSY_A_CORE
Pt states she feels depressed due to old age and not being able to drive anymore to go places.  Feels when she spends time with family she must "put on an act" and pretend she feels great when she is unhappy.  States she has had no further issues with nursing.  Slept poorly but declining med for sleep.  Spoke with pt's son Bassam with pt's permission.  States pt has a h/o irritability at times, he is not surprised she was yelling at nurses.  States she has short term memory loss, but has not been formally dx'd with dementia, although he has taken over paying bills.  States they have invited pt to move in with them but she has declined.  Denies concerns for suicidalty or homicidality.  Feels pt is at baseline.

## 2018-05-15 NOTE — PROVIDER CONTACT NOTE (OTHER) - ACTION/TREATMENT ORDERED:
humidification applied to NC, pt receiving 2.5L.  CBC drawn , pt currently not actively bleeding   will continue to monitor.
MD West came at bedside and spoke with pt. and tried to reassure her. Md West to call son. Will continue to monitor pt.

## 2018-05-16 DIAGNOSIS — R71.0 PRECIPITOUS DROP IN HEMATOCRIT: ICD-10-CM

## 2018-05-16 LAB
AMPHET UR-MCNC: NEGATIVE — SIGNIFICANT CHANGE UP
ANION GAP SERPL CALC-SCNC: 9 MMOL/L — SIGNIFICANT CHANGE UP (ref 5–17)
BARBITURATES UR SCN-MCNC: NEGATIVE — SIGNIFICANT CHANGE UP
BENZODIAZ UR-MCNC: NEGATIVE — SIGNIFICANT CHANGE UP
BUN SERPL-MCNC: 54 MG/DL — HIGH (ref 7–23)
CALCIUM SERPL-MCNC: 8.5 MG/DL — SIGNIFICANT CHANGE UP (ref 8.4–10.5)
CHLORIDE SERPL-SCNC: 104 MMOL/L — SIGNIFICANT CHANGE UP (ref 96–108)
CO2 SERPL-SCNC: 29 MMOL/L — SIGNIFICANT CHANGE UP (ref 22–31)
COCAINE METAB.OTHER UR-MCNC: NEGATIVE — SIGNIFICANT CHANGE UP
CREAT SERPL-MCNC: 1.23 MG/DL — SIGNIFICANT CHANGE UP (ref 0.5–1.3)
GLUCOSE SERPL-MCNC: 132 MG/DL — HIGH (ref 70–99)
HCT VFR BLD CALC: 29.7 % — LOW (ref 34.5–45)
HCT VFR BLD CALC: 29.7 % — LOW (ref 34.5–45)
HGB BLD-MCNC: 10 G/DL — LOW (ref 11.5–15.5)
HGB BLD-MCNC: 9.2 G/DL — LOW (ref 11.5–15.5)
MAGNESIUM SERPL-MCNC: 2.3 MG/DL — SIGNIFICANT CHANGE UP (ref 1.6–2.6)
MCHC RBC-ENTMCNC: 30.1 PG — SIGNIFICANT CHANGE UP (ref 27–34)
MCHC RBC-ENTMCNC: 31 GM/DL — LOW (ref 32–36)
MCHC RBC-ENTMCNC: 32.2 PG — SIGNIFICANT CHANGE UP (ref 27–34)
MCHC RBC-ENTMCNC: 33.6 GM/DL — SIGNIFICANT CHANGE UP (ref 32–36)
MCV RBC AUTO: 95.7 FL — SIGNIFICANT CHANGE UP (ref 80–100)
MCV RBC AUTO: 97.1 FL — SIGNIFICANT CHANGE UP (ref 80–100)
METHADONE UR-MCNC: NEGATIVE — SIGNIFICANT CHANGE UP
OPIATES UR-MCNC: NEGATIVE — SIGNIFICANT CHANGE UP
OXYCODONE UR-MCNC: NEGATIVE — SIGNIFICANT CHANGE UP
PCP SPEC-MCNC: SIGNIFICANT CHANGE UP
PCP UR-MCNC: NEGATIVE — SIGNIFICANT CHANGE UP
PHOSPHATE SERPL-MCNC: 3.7 MG/DL — SIGNIFICANT CHANGE UP (ref 2.5–4.5)
PLATELET # BLD AUTO: 156 K/UL — SIGNIFICANT CHANGE UP (ref 150–400)
PLATELET # BLD AUTO: 163 K/UL — SIGNIFICANT CHANGE UP (ref 150–400)
POTASSIUM SERPL-MCNC: 4.2 MMOL/L — SIGNIFICANT CHANGE UP (ref 3.5–5.3)
POTASSIUM SERPL-SCNC: 4.2 MMOL/L — SIGNIFICANT CHANGE UP (ref 3.5–5.3)
RBC # BLD: 3.06 M/UL — LOW (ref 3.8–5.2)
RBC # BLD: 3.1 M/UL — LOW (ref 3.8–5.2)
RBC # FLD: 13.3 % — SIGNIFICANT CHANGE UP (ref 10.3–14.5)
RBC # FLD: 14.4 % — SIGNIFICANT CHANGE UP (ref 10.3–14.5)
SODIUM SERPL-SCNC: 142 MMOL/L — SIGNIFICANT CHANGE UP (ref 135–145)
T PALLIDUM AB TITR SER: NEGATIVE — SIGNIFICANT CHANGE UP
THC UR QL: NEGATIVE — SIGNIFICANT CHANGE UP
WBC # BLD: 10.5 K/UL — SIGNIFICANT CHANGE UP (ref 3.8–10.5)
WBC # BLD: 9.32 K/UL — SIGNIFICANT CHANGE UP (ref 3.8–10.5)
WBC # FLD AUTO: 10.5 K/UL — SIGNIFICANT CHANGE UP (ref 3.8–10.5)
WBC # FLD AUTO: 9.32 K/UL — SIGNIFICANT CHANGE UP (ref 3.8–10.5)

## 2018-05-16 PROCEDURE — 71045 X-RAY EXAM CHEST 1 VIEW: CPT | Mod: 26

## 2018-05-16 PROCEDURE — 99233 SBSQ HOSP IP/OBS HIGH 50: CPT | Mod: GC

## 2018-05-16 RX ADMIN — CARVEDILOL PHOSPHATE 3.12 MILLIGRAM(S): 80 CAPSULE, EXTENDED RELEASE ORAL at 23:47

## 2018-05-16 RX ADMIN — HEPARIN SODIUM 1100 UNIT(S)/HR: 5000 INJECTION INTRAVENOUS; SUBCUTANEOUS at 00:09

## 2018-05-16 RX ADMIN — Medication 81 MILLIGRAM(S): at 11:33

## 2018-05-16 RX ADMIN — Medication 0.25 MILLIGRAM(S): at 04:00

## 2018-05-16 RX ADMIN — Medication 25.63 MILLIGRAM(S): at 15:25

## 2018-05-16 RX ADMIN — SERTRALINE 200 MILLIGRAM(S): 25 TABLET, FILM COATED ORAL at 11:33

## 2018-05-16 RX ADMIN — Medication 0.25 MILLIGRAM(S): at 11:11

## 2018-05-16 RX ADMIN — ATORVASTATIN CALCIUM 80 MILLIGRAM(S): 80 TABLET, FILM COATED ORAL at 23:47

## 2018-05-16 RX ADMIN — CARVEDILOL PHOSPHATE 3.12 MILLIGRAM(S): 80 CAPSULE, EXTENDED RELEASE ORAL at 11:33

## 2018-05-16 RX ADMIN — Medication 3 MILLIGRAM(S): at 23:47

## 2018-05-16 NOTE — CHART NOTE - NSCHARTNOTEFT_GEN_A_CORE
Upon Nutritional Assessment by the Registered Dietitian your patient was determined to meet criteria / has evidence of the following diagnosis/diagnoses:          [ ]  Mild Protein Calorie Malnutrition        [ ]  Moderate Protein Calorie Malnutrition        [x] Severe Protein Calorie Malnutrition        [ ] Unspecified Protein Calorie Malnutrition        [ ] Underweight / BMI <19        [ ] Morbid Obesity / BMI > 40      Findings as based on:  [x] Comprehensive nutrition assessment   [ ] Nutrition Focused Physical Exam  [ ] Other:       Nutrition Plan/Recommendations:      Please refer to RD initial assessment for interventions and recommendations.    PROVIDER Section:     By signing this assessment you are acknowledging and agree with the diagnosis/diagnoses assigned by the Registered Dietitian    Comments:

## 2018-05-16 NOTE — DIETITIAN INITIAL EVALUATION ADULT. - NS AS NUTRI INTERV MEALS SNACK
General/healthful diet/Recommend liberalize diet to low sodium to provide more food choices. Encourage po intake.

## 2018-05-16 NOTE — PROGRESS NOTE ADULT - PROBLEM SELECTOR PLAN 2
Pt with HARPREET this am, likely from contrast induced nephropathy from CTA, now improving. Pt with HARPREET this am, likely from contrast induced nephropathy from CTA, now improving. monitor BMP

## 2018-05-16 NOTE — PROGRESS NOTE ADULT - SUBJECTIVE AND OBJECTIVE BOX
Rachael Ashford MD, PhD  PGY-1| Internal Medicine  [phone] / [phone]  Team 1  ========================    Patient is a 89y old  Female who presents with a chief complaint of Pulmonary Embolism (11 May 2018 18:51)    Interval History: Pt examined at bedside.  Overnight, patient was agitated and got ativan.  Sleepy this am.  Pt states she does not feel as awake today.  Denies f/c/n/v.  Pt had one episode of epitaxis yesterday.  On tele, pt is AV paced 60s.    MEDICATIONS  (STANDING):  aspirin enteric coated 81 milliGRAM(s) Oral daily  atorvastatin 80 milliGRAM(s) Oral at bedtime  carvedilol 3.125 milliGRAM(s) Oral every 12 hours  heparin  Infusion.  Unit(s)/Hr (11 mL/Hr) IV Continuous <Continuous>  sertraline 200 milliGRAM(s) Oral daily  sodium chloride 0.9%. 1000 milliLiter(s) (50 mL/Hr) IV Continuous <Continuous>    MEDICATIONS  (PRN):  acetaminophen   Tablet. 650 milliGRAM(s) Oral every 6 hours PRN Mild and Moderate Pain (1 - 6)  heparin  Injectable 5000 Unit(s) IV Push every 6 hours PRN For aPTT less than 40  heparin  Injectable 2500 Unit(s) IV Push every 6 hours PRN For aPTT between 40 - 57  LORazepam   Injectable 0.25 milliGRAM(s) IV Push every 6 hours PRN severe agitation  melatonin 3 milliGRAM(s) Oral at bedtime PRN Insomnia  valproate sodium IVPB 125 milliGRAM(s) IV Intermittent every 8 hours PRN Agitation      Objective    Vital Signs Last 24 Hrs  T(C): 36.6 (16 May 2018 04:27), Max: 36.6 (15 May 2018 14:03)  T(F): 97.8 (16 May 2018 04:27), Max: 97.8 (15 May 2018 14:03)  HR: 73 (16 May 2018 11:39) (56 - 76)  BP: 103/64 (16 May 2018 11:39) (91/50 - 111/54)  BP(mean): --  RR: 22 (16 May 2018 04:27) (19 - 22)  SpO2: 98% (16 May 2018 04:27) (96% - 98%)      CAPILLARY BLOOD GLUCOSE    05-15-18 @ 07:01  -  05-16-18 @ 07:00  --------------------------------------------------------  IN: 540 mL / OUT: 450 mL / NET: 90 mL    05-16-18 @ 07:01  -  05-16-18 @ 11:40  --------------------------------------------------------  IN: 360 mL / OUT: 0 mL / NET: 360 mL        Appearance: Sitting in bed, NAD  Cardiovascular: RRR, No murmurs, gallops or rubs appreciated  Respiratory: Lungs clear to auscultation bilaterally, no wheezes, crackles appreciated  Gastrointestinal:  Soft, nondistended, nontender, +BS	  Skin: Mild eccymosis on legs with petichia.	  Neurologic: AOx3, CNII-XII grossly intact, motor and sensory function grossly intact  Extremities: Moving all extremities equally, no edema  Psych:  Normal mood and affect, responds to questions appropriately      05-16    142  |  104  |  54<H>  ----------------------------<  132<H>  4.2   |  29  |  1.23  05-15    141  |  103  |  54<H>  ----------------------------<  128<H>  4.2   |  27  |  1.42<H>  05-14    141  |  102  |  53<H>  ----------------------------<  121<H>  4.0   |  24  |  1.48<H>    Ca    8.5      16 May 2018 05:58  Ca    8.8      15 May 2018 06:11  Ca    8.6      14 May 2018 06:48  Phos  3.7     05-16  Mg     2.3     05-16        PTT - ( 15 May 2018 22:51 )  PTT:84.3 sec                                        9.2    9.32  )-----------( 156      ( 16 May 2018 07:42 )             29.7                         10.5   9.1   )-----------( 148      ( 15 May 2018 15:32 )             31.8                         10.1   8.05  )-----------( 154      ( 15 May 2018 07:38 )             32.0     CAPILLARY BLOOD GLUCOSE              Radiology & Imaging    Imaging Personally Reviewed:    Consultant Notes Reviewed

## 2018-05-16 NOTE — DIETITIAN INITIAL EVALUATION ADULT. - NS AS NUTRI INTERV MED MANAGE
Discussed with NP re: Bowel regimen for constipation; encourage po food and fluids. To discuss with team re: Bowel regimen for constipation; encourage po food and fluids.

## 2018-05-16 NOTE — PROGRESS NOTE ADULT - PROBLEM SELECTOR PLAN 1
Pt with acute drop in Hb to 9.2 this am.  Pt had episode of epitaxis yesterday, unlikely to be cause.  - CXR today to r/o intrapulmonic infarct. Pt with acute drop in Hb to 9.2 this am.  Pt had episode of epistaxis  yesterday, unlikely to be cause. likely dilutional will monitor CBC   - CXR today to r/o pleural effusion

## 2018-05-16 NOTE — DIETITIAN INITIAL EVALUATION ADULT. - ENERGY NEEDS
ht: 66 inches. wt: 138.4 pounds (current, standing, no edema). BMI: 22.3 kG/m2. UBW: 148 pounds x ~3 years ago. IBW: 130 pounds +/- 10%. %IBW: 106%  Other pertinent objective information: 89 year old female pt with PMH of HFrEF, CAD/MI (2005, s/p PCI), Severe AS, h/o DVT (2014, no longer on Coumadin), s/p PPM, and HLD presenting with L-sided chest discomfort and associated SOB, found to have PE on CTA, addressed with heparin gtt. IVF in order for HARPREET. No pressure ulcers noted. RN reports pt with no BM >3 days, last documented BM 5/10. Noted that pt lives alone/independently PTA, son visits daily to check on her and is involved in care. Due to pt's poor po and malnourished appearance, concern for pt's ability to meal prep/procure groceries, and at times when lethargic feed herself on her own.

## 2018-05-16 NOTE — PROGRESS NOTE ADULT - PROBLEM SELECTOR PLAN 3
Pt with history of depression, now on sertraline. Pt very afraid of dying, despite reassurance does get anxious.  Given ativan overnight.  - Will need further eval prior to discharge per psych  - c/w sertraline

## 2018-05-16 NOTE — DIETITIAN INITIAL EVALUATION ADULT. - PHYSICAL APPEARANCE
Pt appears underweight, debilitated. Pt unable to consent to nutrition focused physical exam as she is very lethargic.

## 2018-05-16 NOTE — DIETITIAN INITIAL EVALUATION ADULT. - DIET TYPE
Ensure Enlive x 3 daily (1050 kcal, 60 grams protein daily)./DASH/TLC (sodium and cholesterol restricted diet)

## 2018-05-16 NOTE — PROGRESS NOTE ADULT - ASSESSMENT
89 Y/O/ F with PMH of HFrEF (EF ~45%), CAD/MI (2005, s/p PCI), Severe AS, h/o DVT (2014, no longer on Coumadin), s/p PPM ([employer]), and HLD p/w CP/SOB found to have CT evidence of submassive R-sided PE w/ elevated BNP, now on hep gtt improving. Currently in no pain; pt now with HARPREET resolving like SARAH; pt now with drop in Hb today with episode of epitaxis yest.

## 2018-05-16 NOTE — DIETITIAN INITIAL EVALUATION ADULT. - NS AS NUTRI INTERV COLLABORAT
Will provide list of grocery delivery/meal delivery programs at pt's bedside for pt/son to review as able. May be of benefit to pt for home use. Can f/u with SW If interested re: eligibility requirements.

## 2018-05-16 NOTE — DIETITIAN INITIAL EVALUATION ADULT. - OTHER INFO
----- Message from Philip Busby sent at 5/16/2018  1:18 PM CDT -----  Contact: Received Refill Authorization Form/ [facility]/ [phone]  Received Refill Authorization Request from [facility] #[id number]  [address] [address]   Tel: [phone]  Fax: [phone]    Prescription Info:   Drug: Ventolin HFA Albuterol Sulfate Inhaler  Sig: Inhale 2 puffs by mouth every four hours as needed for wheezing and shortness of breath    Total # of pages: 1   Placed request in staff's in-box   Pt seen for LOS admission. Pt currently very lethargic, unable to participate in interview/education at this time despite attempts to arouse. RN aware, pt recently received psych meds that may have promoted lethargy. Attempted to contact pt's son Bassam Quinteros via cell and home phone number ([phone], cell [phone]) however no answer. According to RN, pt is not eating well, barely touches tray or drinks any of the Ensure. Pt also with psych issues which makes her agitated at times causing her to eat less.

## 2018-05-16 NOTE — DIETITIAN INITIAL EVALUATION ADULT. - NUTRITION INTERVENTION
Medical Food Supplements/Meals and Snack/Collaboration and Referral of Nutrition Care/Feeding Assistance/Nutrition - Related Medication Management

## 2018-05-16 NOTE — PROGRESS NOTE ADULT - ATTENDING COMMENTS
Awake alert, mild epistaxis which is resolved   Pulmonary Embolism/ Moderate AS/ h/o chronic HF with Reduced Ejection Fraction/ CAD /HARPREET-   decreasing creatinine trend - continue to monitor creatinine  continue oxygen, continue heparin gtt and monitor PTT, hoping to start NOAC once creatinine drops further   cardiology note appreciated

## 2018-05-17 DIAGNOSIS — I50.23 ACUTE ON CHRONIC SYSTOLIC (CONGESTIVE) HEART FAILURE: ICD-10-CM

## 2018-05-17 LAB
ANION GAP SERPL CALC-SCNC: 11 MMOL/L — SIGNIFICANT CHANGE UP (ref 5–17)
APTT BLD: 63.6 SEC — HIGH (ref 27.5–37.4)
BUN SERPL-MCNC: 54 MG/DL — HIGH (ref 7–23)
CALCIUM SERPL-MCNC: 8.6 MG/DL — SIGNIFICANT CHANGE UP (ref 8.4–10.5)
CHLORIDE SERPL-SCNC: 106 MMOL/L — SIGNIFICANT CHANGE UP (ref 96–108)
CO2 SERPL-SCNC: 27 MMOL/L — SIGNIFICANT CHANGE UP (ref 22–31)
CREAT SERPL-MCNC: 1.13 MG/DL — SIGNIFICANT CHANGE UP (ref 0.5–1.3)
GLUCOSE SERPL-MCNC: 116 MG/DL — HIGH (ref 70–99)
HCT VFR BLD CALC: 26.7 % — LOW (ref 34.5–45)
HGB BLD-MCNC: 8.6 G/DL — LOW (ref 11.5–15.5)
MAGNESIUM SERPL-MCNC: 2.3 MG/DL — SIGNIFICANT CHANGE UP (ref 1.6–2.6)
MCHC RBC-ENTMCNC: 30.9 PG — SIGNIFICANT CHANGE UP (ref 27–34)
MCHC RBC-ENTMCNC: 32.2 GM/DL — SIGNIFICANT CHANGE UP (ref 32–36)
MCV RBC AUTO: 96 FL — SIGNIFICANT CHANGE UP (ref 80–100)
PHOSPHATE SERPL-MCNC: 3.4 MG/DL — SIGNIFICANT CHANGE UP (ref 2.5–4.5)
PLATELET # BLD AUTO: 167 K/UL — SIGNIFICANT CHANGE UP (ref 150–400)
POTASSIUM SERPL-MCNC: 4 MMOL/L — SIGNIFICANT CHANGE UP (ref 3.5–5.3)
POTASSIUM SERPL-SCNC: 4 MMOL/L — SIGNIFICANT CHANGE UP (ref 3.5–5.3)
RBC # BLD: 2.78 M/UL — LOW (ref 3.8–5.2)
RBC # FLD: 14.6 % — HIGH (ref 10.3–14.5)
SODIUM SERPL-SCNC: 144 MMOL/L — SIGNIFICANT CHANGE UP (ref 135–145)
WBC # BLD: 8.93 K/UL — SIGNIFICANT CHANGE UP (ref 3.8–10.5)
WBC # FLD AUTO: 8.93 K/UL — SIGNIFICANT CHANGE UP (ref 3.8–10.5)

## 2018-05-17 PROCEDURE — 99233 SBSQ HOSP IP/OBS HIGH 50: CPT | Mod: GC

## 2018-05-17 PROCEDURE — 74176 CT ABD & PELVIS W/O CONTRAST: CPT | Mod: 26

## 2018-05-17 PROCEDURE — 99233 SBSQ HOSP IP/OBS HIGH 50: CPT

## 2018-05-17 RX ORDER — FUROSEMIDE 40 MG
40 TABLET ORAL ONCE
Qty: 0 | Refills: 0 | Status: COMPLETED | OUTPATIENT
Start: 2018-05-17 | End: 2018-05-17

## 2018-05-17 RX ADMIN — SERTRALINE 200 MILLIGRAM(S): 25 TABLET, FILM COATED ORAL at 12:18

## 2018-05-17 RX ADMIN — CARVEDILOL PHOSPHATE 3.12 MILLIGRAM(S): 80 CAPSULE, EXTENDED RELEASE ORAL at 23:22

## 2018-05-17 RX ADMIN — ATORVASTATIN CALCIUM 80 MILLIGRAM(S): 80 TABLET, FILM COATED ORAL at 22:42

## 2018-05-17 RX ADMIN — Medication 40 MILLIGRAM(S): at 10:04

## 2018-05-17 RX ADMIN — HEPARIN SODIUM 1100 UNIT(S)/HR: 5000 INJECTION INTRAVENOUS; SUBCUTANEOUS at 09:04

## 2018-05-17 RX ADMIN — CARVEDILOL PHOSPHATE 3.12 MILLIGRAM(S): 80 CAPSULE, EXTENDED RELEASE ORAL at 12:18

## 2018-05-17 RX ADMIN — Medication 81 MILLIGRAM(S): at 12:18

## 2018-05-17 NOTE — PROGRESS NOTE ADULT - PROBLEM SELECTOR PLAN 3
Pt with HARPREET this am, likely from contrast induced nephropathy from CTA, now improving. monitor BMP

## 2018-05-17 NOTE — PROGRESS NOTE ADULT - PROBLEM SELECTOR PLAN 1
Pt with acute drop in Hb to 8.6 this am.  Pt had episode of epistaxis  yesterday, unlikely to be cause.   - CT A/P w/o contrast today to r/o RP bleed.

## 2018-05-17 NOTE — PROGRESS NOTE ADULT - SUBJECTIVE AND OBJECTIVE BOX
PAGER:  [phone]               [facility] [address]              EMAIL [email or web address]   OFFICE [phone]                              ********VASCULAR MEDICINE & CARDIOLOGY PROGRESS NOTE********                            CC:  PE    INTERVAL HISTORY:      She is short of breath laying flat.  Rales on exam.  CXR with pulmonary edema.  CBC with lower hemoglobin.        MEDICATIONS  (STANDING):  aspirin enteric coated 81 milliGRAM(s) Oral daily  atorvastatin 80 milliGRAM(s) Oral at bedtime  carvedilol 3.125 milliGRAM(s) Oral every 12 hours  heparin  Infusion.  Unit(s)/Hr (11 mL/Hr) IV Continuous <Continuous>  sertraline 200 milliGRAM(s) Oral daily  sodium chloride 0.9%. 1000 milliLiter(s) (50 mL/Hr) IV Continuous <Continuous>    MEDICATIONS  (PRN):  acetaminophen   Tablet. 650 milliGRAM(s) Oral every 6 hours PRN Mild and Moderate Pain (1 - 6)  heparin  Injectable 5000 Unit(s) IV Push every 6 hours PRN For aPTT less than 40  heparin  Injectable 2500 Unit(s) IV Push every 6 hours PRN For aPTT between 40 - 57  LORazepam   Injectable 0.25 milliGRAM(s) IV Push every 6 hours PRN severe agitation  melatonin 3 milliGRAM(s) Oral at bedtime PRN Insomnia  valproate sodium IVPB 125 milliGRAM(s) IV Intermittent every 8 hours PRN Agitation    PAST MEDICAL & SURGICAL HISTORY:  Depression  High cholesterol  DVT (deep venous thrombosis)  Myocardial infarction: 2005  CAD (coronary artery disease)  S/P hemorrhoidectomy      FAMILY HISTORY:  No pertinent family history in first degree relatives      SOCIAL HISTORY:  unchanged    REVIEW OF SYSTEMS:  CONSTITUTIONAL: No fever, weight loss, or fatigue  EYES: No eye pain, visual disturbances, or discharge  ENMT:  No difficulty hearing, tinnitus, vertigo; No sinus or throat pain  NECK: No pain or stiffness  RESPIRATORY: No cough, wheezing, chills or hemoptysis; + SOB  CARDIOVASCULAR: No chest pain, palpitations, passing out, dizziness, or leg swelling  GASTROINTESTINAL: No abdominal or epigastric pain. No nausea, vomiting, or hematemesis; No diarrhea or constipation. No melena or hematochezia.  GENITOURINARY: No dysuria, frequency, hematuria, or incontinence  NEUROLOGICAL: No headaches, memory loss, loss of strength, numbness, or tremors  SKIN: No itching, burning, rashes, or lesions   LYMPH Nodes: No enlarged glands  ENDOCRINE: No heat or cold intolerance; No hair loss  MUSCULOSKELETAL: No joint pain or swelling; No muscle, back, or extremity pain  PSYCHIATRIC: No depression, anxiety, mood swings, or difficulty sleeping  HEME/LYMPH: No easy bruising, or bleeding gums  ALLERY AND IMMUNOLOGIC: No hives or eczema	    [ x] All others negative	  [ ] Unable to obtain    ICU Vital Signs Last 24 Hrs  T(C): 36.8 (17 May 2018 04:34), Max: 36.8 (17 May 2018 04:34)  T(F): 98.3 (17 May 2018 04:34), Max: 98.3 (17 May 2018 04:34)  HR: 83 (17 May 2018 10:00) (69 - 83)  BP: 101/64 (17 May 2018 10:00) (101/64 - 110/59)  BP(mean): --  ABP: --  ABP(mean): --  RR: 16 (17 May 2018 10:00) (16 - 22)  SpO2: 96% (17 May 2018 10:00) (94% - 98%)          Appearance: Normal	  HEENT:   Normal oral mucosa, PERRL, EOMI	  Lymphatic: No lymphadenopathy  Cardiovascular: Normal S1 S2m, + ANNIA  Respiratory: RALES on exam  Psychiatry: A & O x 3, Mood & affect appropriate  Gastrointestinal:  Soft, Non-tender, + BS	  Skin: No rashes, No ecchymoses, No cyanosis	  Neurologic: Non-focal  Extremities: Normal range of motion, No clubbing, cyanosis or edema  Vascular: Peripheral pulses palpable 2+ bilaterally                           8.6    8.93  )-----------( 167      ( 17 May 2018 07:52 )             26.7   05-17    144  |  106  |  54<H>  ----------------------------<  116<H>  4.0   |  27  |  1.13    Ca    8.6      17 May 2018 05:19  Phos  3.4     05-17  Mg     2.3     05-17

## 2018-05-17 NOTE — PROGRESS NOTE ADULT - SUBJECTIVE AND OBJECTIVE BOX
Rachael Ashford MD, PhD  PGY-1| Internal Medicine  [phone] / [phone]  Team 1  ==========================      Patient is a 89y old  Female who presents with a chief complaint of Pulmonary Embolism (11 May 2018 18:51)    Interval History: Pt examined at bedside this am.  Overnight, pt had nosebleed with Hb checked, 10 at time of bleed.  No acute events.  This am, patient complaining of sob with worry that she will die.  Per nursing O2 sat has remained stable.  On tele, A-V paced 60s.    MEDICATIONS  (STANDING):  aspirin enteric coated 81 milliGRAM(s) Oral daily  atorvastatin 80 milliGRAM(s) Oral at bedtime  carvedilol 3.125 milliGRAM(s) Oral every 12 hours  heparin  Infusion.  Unit(s)/Hr (11 mL/Hr) IV Continuous <Continuous>  sertraline 200 milliGRAM(s) Oral daily    MEDICATIONS  (PRN):  acetaminophen   Tablet. 650 milliGRAM(s) Oral every 6 hours PRN Mild and Moderate Pain (1 - 6)  heparin  Injectable 5000 Unit(s) IV Push every 6 hours PRN For aPTT less than 40  heparin  Injectable 2500 Unit(s) IV Push every 6 hours PRN For aPTT between 40 - 57  LORazepam   Injectable 0.25 milliGRAM(s) IV Push every 6 hours PRN severe agitation  melatonin 3 milliGRAM(s) Oral at bedtime PRN Insomnia  valproate sodium IVPB 125 milliGRAM(s) IV Intermittent every 8 hours PRN Agitation      Objective    Vital Signs Last 24 Hrs  T(C): 36.8 (17 May 2018 12:17), Max: 36.8 (17 May 2018 04:34)  T(F): 98.3 (17 May 2018 12:17), Max: 98.3 (17 May 2018 04:34)  HR: 76 (17 May 2018 12:17) (69 - 83)  BP: 107/64 (17 May 2018 12:17) (101/64 - 110/59)  BP(mean): --  RR: 16 (17 May 2018 12:17) (16 - 22)  SpO2: 98% (17 May 2018 12:17) (94% - 98%)      CAPILLARY BLOOD GLUCOSE            05-16-18 @ 07:01  -  05-17-18 @ 07:00  --------------------------------------------------------  IN: 700 mL / OUT: 400 mL / NET: 300 mL        Appearance: Sitting in bed, worried, anxious  Cardiovascular: RRR, No murmurs, gallops or rubs appreciated  Respiratory: Lungs clear to auscultation bilaterally, no wheezes, crackles appreciated  Gastrointestinal:  Soft, nondistended, nontender, +BS	  Neurologic: AOx3, CNII-XII grossly intact, motor and sensory function grossly intact  Extremities: Moving all extremities equally; no edema noted  Psych:  Anxious mood; worried      05-17    144  |  106  |  54<H>  ----------------------------<  116<H>  4.0   |  27  |  1.13  05-16    142  |  104  |  54<H>  ----------------------------<  132<H>  4.2   |  29  |  1.23  05-15    141  |  103  |  54<H>  ----------------------------<  128<H>  4.2   |  27  |  1.42<H>    Ca    8.6      17 May 2018 05:19  Ca    8.5      16 May 2018 05:58  Ca    8.8      15 May 2018 06:11  Phos  3.4     05-17  Mg     2.3     05-17        PTT - ( 17 May 2018 07:52 )  PTT:63.6 sec                                        8.6    8.93  )-----------( 167      ( 17 May 2018 07:52 )             26.7                         10.0   10.5  )-----------( 163      ( 16 May 2018 22:53 )             29.7                         9.2    9.32  )-----------( 156      ( 16 May 2018 07:42 )             29.7     CAPILLARY BLOOD GLUCOSE              Radiology & Imaging    Imaging Personally Reviewed:    Consultant Notes Reviewed

## 2018-05-17 NOTE — PROGRESS NOTE ADULT - PROBLEM SELECTOR PLAN 2
Some pulm edema seen on xray, now s/p 40 mg IV lasix.   Likely in setting of holding home lasix dose  - Will consider restarting home lasix tomorrow.

## 2018-05-17 NOTE — PROGRESS NOTE ADULT - ASSESSMENT
89 Y/O/ F with PMH of HFrEF (EF ~45%), CAD/MI (2005, s/p PCI), Severe AS, h/o DVT (2014, no longer on Coumadin), s/p PPM ([employer]), and HLD p/w CP/SOB found to have CT evidence of submassive R-sided PE w/ elevated BNP, now on hep gtt improving. Currently in no pain; pt now with HARPREET resolving like SARAH; pt now with drop in Hb today with episode of epitaxis yest again.

## 2018-05-17 NOTE — PROGRESS NOTE ADULT - PROBLEM SELECTOR PLAN 4
Pt with history of depression, now on sertraline. Pt very afraid of dying, despite reassurance does get anxious.    - Will need further eval prior to discharge per psych  - c/w sertraline

## 2018-05-17 NOTE — PROGRESS NOTE ADULT - ATTENDING COMMENTS
Awake alert, mild epistaxis is very mild   No melena or BRBPR.  CXR personally visualized c/w pulmonary edema   Pulmonary Embolism/ Moderate AS/ h/o chronic HF with Reduced Ejection Fraction/ CAD /HARPREET- with more sob , cxr with pulmonary edema - will give 40 mg of IV lasix x1 monitor creatinine,   Anemia- no obvious external bleed epistaxis is mild   r/o RP bleed   if negative then start Elliquis  d/c Dr Lynch   continue oxygen, continue heparin gtt and monitor PTT.

## 2018-05-17 NOTE — PROGRESS NOTE ADULT - PROBLEM SELECTOR PLAN 5
-Heparin gtt for AC, transition to oral agent on d/c  - Per cards, will do eloquis starting tomorrow if no bleed  - c/w heparin; tylenol prn pain control for now

## 2018-05-17 NOTE — PROGRESS NOTE ADULT - ASSESSMENT
1.  Pulmonary Embolism  2.  Moderate AS  3.  Reduced Ejection Fraction  4.  Atheroslerosis      Plan:  1.  Stop IV fluids, she is in acute decompensated systolic heart failure  2.  Give Lasix 40mg IV x 1 now  3.  Continue heparin gtt for now.  I spoke with Dr. Delgadillo, we will look for other causes of anemia - guaic, rule out rp bleed etc.  4.  If CBC stable, and no source of bleeding identified then would switch to Eliquis 5mg BID (without a run-in dose of 10mg x 7 days due to age and bleeding risk)        Will follow with you    Dionte Lynch  [id number]

## 2018-05-17 NOTE — PROGRESS NOTE ADULT - PROBLEM SELECTOR PLAN 7
- holding lasix in setting of PE; will restart tomorrow.  - EF 35-40%, VTI 13cm  - now with acute exacerbation likely 2/2 fluids and holding lasix.

## 2018-05-18 LAB
ALBUMIN SERPL ELPH-MCNC: 2.9 G/DL — LOW (ref 3.3–5)
ALP SERPL-CCNC: 66 U/L — SIGNIFICANT CHANGE UP (ref 40–120)
ALT FLD-CCNC: 14 U/L — SIGNIFICANT CHANGE UP (ref 10–45)
ANION GAP SERPL CALC-SCNC: 9 MMOL/L — SIGNIFICANT CHANGE UP (ref 5–17)
APTT BLD: 72.5 SEC — HIGH (ref 27.5–37.4)
AST SERPL-CCNC: 13 U/L — SIGNIFICANT CHANGE UP (ref 10–40)
BASOPHILS # BLD AUTO: 0.03 K/UL — SIGNIFICANT CHANGE UP (ref 0–0.2)
BASOPHILS NFR BLD AUTO: 0.4 % — SIGNIFICANT CHANGE UP (ref 0–2)
BILIRUB SERPL-MCNC: 0.6 MG/DL — SIGNIFICANT CHANGE UP (ref 0.2–1.2)
BUN SERPL-MCNC: 51 MG/DL — HIGH (ref 7–23)
CALCIUM SERPL-MCNC: 8.7 MG/DL — SIGNIFICANT CHANGE UP (ref 8.4–10.5)
CHLORIDE SERPL-SCNC: 105 MMOL/L — SIGNIFICANT CHANGE UP (ref 96–108)
CO2 SERPL-SCNC: 32 MMOL/L — HIGH (ref 22–31)
CREAT SERPL-MCNC: 1.11 MG/DL — SIGNIFICANT CHANGE UP (ref 0.5–1.3)
EOSINOPHIL # BLD AUTO: 0.72 K/UL — HIGH (ref 0–0.5)
EOSINOPHIL NFR BLD AUTO: 9.7 % — HIGH (ref 0–6)
GLUCOSE SERPL-MCNC: 105 MG/DL — HIGH (ref 70–99)
HAPTOGLOB SERPL-MCNC: 221 MG/DL — HIGH (ref 34–200)
HCT VFR BLD CALC: 24.3 % — LOW (ref 34.5–45)
HGB BLD-MCNC: 8.1 G/DL — LOW (ref 11.5–15.5)
IMM GRANULOCYTES NFR BLD AUTO: 0.1 % — SIGNIFICANT CHANGE UP (ref 0–1.5)
IRON SATN MFR SERPL: 14 % — SIGNIFICANT CHANGE UP (ref 14–50)
IRON SATN MFR SERPL: 29 UG/DL — LOW (ref 30–160)
LDH SERPL L TO P-CCNC: 146 U/L — SIGNIFICANT CHANGE UP (ref 50–242)
LYMPHOCYTES # BLD AUTO: 0.93 K/UL — LOW (ref 1–3.3)
LYMPHOCYTES # BLD AUTO: 12.5 % — LOW (ref 13–44)
MAGNESIUM SERPL-MCNC: 2.1 MG/DL — SIGNIFICANT CHANGE UP (ref 1.6–2.6)
MCHC RBC-ENTMCNC: 31.4 PG — SIGNIFICANT CHANGE UP (ref 27–34)
MCHC RBC-ENTMCNC: 33.3 GM/DL — SIGNIFICANT CHANGE UP (ref 32–36)
MCV RBC AUTO: 94.2 FL — SIGNIFICANT CHANGE UP (ref 80–100)
MONOCYTES # BLD AUTO: 0.71 K/UL — SIGNIFICANT CHANGE UP (ref 0–0.9)
MONOCYTES NFR BLD AUTO: 9.6 % — SIGNIFICANT CHANGE UP (ref 2–14)
NEUTROPHILS # BLD AUTO: 5.02 K/UL — SIGNIFICANT CHANGE UP (ref 1.8–7.4)
NEUTROPHILS NFR BLD AUTO: 67.7 % — SIGNIFICANT CHANGE UP (ref 43–77)
PHOSPHATE SERPL-MCNC: 3.6 MG/DL — SIGNIFICANT CHANGE UP (ref 2.5–4.5)
PLATELET # BLD AUTO: 167 K/UL — SIGNIFICANT CHANGE UP (ref 150–400)
POTASSIUM SERPL-MCNC: 3.7 MMOL/L — SIGNIFICANT CHANGE UP (ref 3.5–5.3)
POTASSIUM SERPL-SCNC: 3.7 MMOL/L — SIGNIFICANT CHANGE UP (ref 3.5–5.3)
PROT SERPL-MCNC: 5.6 G/DL — LOW (ref 6–8.3)
RBC # BLD: 2.58 M/UL — LOW (ref 3.8–5.2)
RBC # FLD: 15 % — HIGH (ref 10.3–14.5)
SODIUM SERPL-SCNC: 146 MMOL/L — HIGH (ref 135–145)
TIBC SERPL-MCNC: 202 UG/DL — LOW (ref 220–430)
UIBC SERPL-MCNC: 173 UG/DL — SIGNIFICANT CHANGE UP (ref 110–370)
WBC # BLD: 7.42 K/UL — SIGNIFICANT CHANGE UP (ref 3.8–10.5)
WBC # FLD AUTO: 7.42 K/UL — SIGNIFICANT CHANGE UP (ref 3.8–10.5)

## 2018-05-18 PROCEDURE — 99233 SBSQ HOSP IP/OBS HIGH 50: CPT | Mod: GC

## 2018-05-18 PROCEDURE — 99233 SBSQ HOSP IP/OBS HIGH 50: CPT

## 2018-05-18 RX ORDER — FUROSEMIDE 40 MG
20 TABLET ORAL DAILY
Qty: 0 | Refills: 0 | Status: DISCONTINUED | OUTPATIENT
Start: 2018-05-18 | End: 2018-05-20

## 2018-05-18 RX ORDER — APIXABAN 2.5 MG/1
5 TABLET, FILM COATED ORAL EVERY 12 HOURS
Qty: 0 | Refills: 0 | Status: DISCONTINUED | OUTPATIENT
Start: 2018-05-18 | End: 2018-05-18

## 2018-05-18 RX ORDER — DOCUSATE SODIUM 100 MG
100 CAPSULE ORAL THREE TIMES A DAY
Qty: 0 | Refills: 0 | Status: DISCONTINUED | OUTPATIENT
Start: 2018-05-18 | End: 2018-05-21

## 2018-05-18 RX ORDER — POLYETHYLENE GLYCOL 3350 17 G/17G
17 POWDER, FOR SOLUTION ORAL DAILY
Qty: 0 | Refills: 0 | Status: DISCONTINUED | OUTPATIENT
Start: 2018-05-18 | End: 2018-05-21

## 2018-05-18 RX ORDER — APIXABAN 2.5 MG/1
5 TABLET, FILM COATED ORAL EVERY 12 HOURS
Qty: 0 | Refills: 0 | Status: DISCONTINUED | OUTPATIENT
Start: 2018-05-18 | End: 2018-05-21

## 2018-05-18 RX ORDER — SENNA PLUS 8.6 MG/1
2 TABLET ORAL AT BEDTIME
Qty: 0 | Refills: 0 | Status: DISCONTINUED | OUTPATIENT
Start: 2018-05-18 | End: 2018-05-21

## 2018-05-18 RX ADMIN — ATORVASTATIN CALCIUM 80 MILLIGRAM(S): 80 TABLET, FILM COATED ORAL at 21:05

## 2018-05-18 RX ADMIN — Medication 100 MILLIGRAM(S): at 21:05

## 2018-05-18 RX ADMIN — CARVEDILOL PHOSPHATE 3.12 MILLIGRAM(S): 80 CAPSULE, EXTENDED RELEASE ORAL at 23:37

## 2018-05-18 RX ADMIN — SENNA PLUS 2 TABLET(S): 8.6 TABLET ORAL at 21:05

## 2018-05-18 RX ADMIN — Medication 81 MILLIGRAM(S): at 11:48

## 2018-05-18 RX ADMIN — Medication 100 MILLIGRAM(S): at 16:33

## 2018-05-18 RX ADMIN — APIXABAN 5 MILLIGRAM(S): 2.5 TABLET, FILM COATED ORAL at 23:38

## 2018-05-18 RX ADMIN — CARVEDILOL PHOSPHATE 3.12 MILLIGRAM(S): 80 CAPSULE, EXTENDED RELEASE ORAL at 11:48

## 2018-05-18 RX ADMIN — APIXABAN 5 MILLIGRAM(S): 2.5 TABLET, FILM COATED ORAL at 12:58

## 2018-05-18 RX ADMIN — Medication 20 MILLIGRAM(S): at 16:34

## 2018-05-18 RX ADMIN — SERTRALINE 200 MILLIGRAM(S): 25 TABLET, FILM COATED ORAL at 11:48

## 2018-05-18 RX ADMIN — HEPARIN SODIUM 1100 UNIT(S)/HR: 5000 INJECTION INTRAVENOUS; SUBCUTANEOUS at 08:40

## 2018-05-18 NOTE — PROGRESS NOTE ADULT - PROBLEM SELECTOR PLAN 1
Pt with acute drop in Hb to 8.1 this am.  Pt had episodes of epistaxis but to be cause.   - CT A/P w/o contrast showed no RP bleed  - Will stop heparin and transition to eliquis and monitor.

## 2018-05-18 NOTE — PROGRESS NOTE ADULT - SUBJECTIVE AND OBJECTIVE BOX
Rachael Ashford MD, PhD  PGY-1| Internal Medicine  [phone] / [id number]  Team 1  ========================    Patient is a 89y old  Female who presents with a chief complaint of Pulmonary Embolism (11 May 2018 18:51)    Interval History: Pt examined at bedside this am.  Pt had CT of the abdomen/pelvis last night showing no RP bleed.  Denies fevers, chills at this point.  Pt denies any blood in stools.  Continues to c/o loneliness and fear of dying.  Pt continues to c/o trouble breathing.  On tele, patient is A-V paced in 60s.  MEDICATIONS  (STANDING):  apixaban 5 milliGRAM(s) Oral every 12 hours  aspirin enteric coated 81 milliGRAM(s) Oral daily  atorvastatin 80 milliGRAM(s) Oral at bedtime  carvedilol 3.125 milliGRAM(s) Oral every 12 hours  docusate sodium 100 milliGRAM(s) Oral three times a day  furosemide    Tablet 20 milliGRAM(s) Oral daily  senna 2 Tablet(s) Oral at bedtime  sertraline 200 milliGRAM(s) Oral daily    MEDICATIONS  (PRN):  acetaminophen   Tablet. 650 milliGRAM(s) Oral every 6 hours PRN Mild and Moderate Pain (1 - 6)  LORazepam   Injectable 0.25 milliGRAM(s) IV Push every 6 hours PRN severe agitation  melatonin 3 milliGRAM(s) Oral at bedtime PRN Insomnia  polyethylene glycol 3350 17 Gram(s) Oral daily PRN Constipation  valproate sodium IVPB 125 milliGRAM(s) IV Intermittent every 8 hours PRN Agitation      Objective    Vital Signs Last 24 Hrs  T(C): 36.8 (18 May 2018 04:05), Max: 36.9 (17 May 2018 22:30)  T(F): 98.3 (18 May 2018 04:05), Max: 98.5 (17 May 2018 22:30)  HR: 70 (18 May 2018 11:48) (63 - 70)  BP: 95/42 (18 May 2018 11:48) (95/42 - 108/56)  BP(mean): --  RR: 20 (18 May 2018 04:05) (20 - 20)  SpO2: 97% (18 May 2018 04:05) (95% - 97%)      CAPILLARY BLOOD GLUCOSE  05-17-18 @ 07:01  -  05-18-18 @ 07:00  --------------------------------------------------------  IN: 0 mL / OUT: 100 mL / NET: -100 mL        Appearance: Sitting in bed, NAD  Cardiovascular: RRR, No murmurs, gallops or rubs appreciated  Respiratory: Lungs clear to auscultation bilaterally, no wheezes, minimal crackles at bases appreciated  Gastrointestinal:  Soft, nondistended, nontender, +BS	  Skin: No rashes, eccymosis or cyanosis noted  Neurologic: AOx3, CNII-XII grossly intact, motor and sensory function grossly intact  Extremities: Moving all extremities equally, no edema noted.  Vascular: palpable dp, pt, femoral and radial pulses 2+ bilaterally  Psych:  Normal mood and affect, responds to questions appropriately      05-18    146<H>  |  105  |  51<H>  ----------------------------<  105<H>  3.7   |  32<H>  |  1.11  05-17    144  |  106  |  54<H>  ----------------------------<  116<H>  4.0   |  27  |  1.13  05-16    142  |  104  |  54<H>  ----------------------------<  132<H>  4.2   |  29  |  1.23    Ca    8.7      18 May 2018 05:20  Ca    8.6      17 May 2018 05:19  Ca    8.5      16 May 2018 05:58  Phos  3.6     05-18  Mg     2.1     05-18    TPro  5.6<L>  /  Alb  2.9<L>  /  TBili  0.6  /  DBili  x   /  AST  13  /  ALT  14  /  AlkPhos  66  05-18      PTT - ( 18 May 2018 07:57 )  PTT:72.5 sec                                        8.1    7.42  )-----------( 167      ( 18 May 2018 07:52 )             24.3                         8.6    8.93  )-----------( 167      ( 17 May 2018 07:52 )             26.7                         10.0   10.5  )-----------( 163      ( 16 May 2018 22:53 )             29.7     CAPILLARY BLOOD GLUCOSE              Radiology & Imaging    Imaging Personally Reviewed:    Consultant Notes Reviewed:  appreciate cards input.

## 2018-05-18 NOTE — PROGRESS NOTE ADULT - ATTENDING COMMENTS
No melena or BRBPR.  Epistaxis is better.  CT A/P reviewed and no RP bleed  Pulmonary Embolism/ Moderate AS/ h/o chronic HF with Reduced Ejection Fraction/ CAD /HARPREET-   will start Eliquis and d/c heparin d/w Dr Lynch, vascular attending   Anemia- no obvious external bleed epistaxis is mild likely dilutional.

## 2018-05-18 NOTE — PROGRESS NOTE ADULT - ASSESSMENT
89 Y/O/ F with PMH of HFrEF (EF ~45%), CAD/MI (2005, s/p PCI), Severe AS, h/o DVT (2014, no longer on Coumadin), s/p PPM ([employer]), and HLD p/w CP/SOB found to have CT evidence of submassive R-sided PE w/ elevated BNP, now on hep gtt improving. Currently in no pain; pt now with HARPREET resolving like SARAH; pt now with drop in Hb, no source noted.

## 2018-05-18 NOTE — PROGRESS NOTE ADULT - SUBJECTIVE AND OBJECTIVE BOX
PAGER:  [phone]               [facility] [id number]              EMAIL [email or web address]   OFFICE [phone]                              ********VASCULAR MEDICINE & CARDIOLOGY PROGRESS NOTE********                            CC:  PE    INTERVAL HISTORY:  No events.  Feels better after lasix.  No source of anemia found.  on Heparin gtt.  Some Epistaxis         Allergies    latex (Blisters)  lisinopril (Angioedema)  penicillins (Unknown)  shellfish (Unknown)  sulfa drugs (Unknown)    Intolerances    	    MEDICATIONS:  aspirin enteric coated 81 milliGRAM(s) Oral daily  carvedilol 3.125 milliGRAM(s) Oral every 12 hours  heparin  Infusion.  Unit(s)/Hr IV Continuous <Continuous>  heparin  Injectable 5000 Unit(s) IV Push every 6 hours PRN  heparin  Injectable 2500 Unit(s) IV Push every 6 hours PRN        acetaminophen   Tablet. 650 milliGRAM(s) Oral every 6 hours PRN  LORazepam   Injectable 0.25 milliGRAM(s) IV Push every 6 hours PRN  melatonin 3 milliGRAM(s) Oral at bedtime PRN  sertraline 200 milliGRAM(s) Oral daily  valproate sodium IVPB 125 milliGRAM(s) IV Intermittent every 8 hours PRN      atorvastatin 80 milliGRAM(s) Oral at bedtime        PAST MEDICAL & SURGICAL HISTORY:  Depression  High cholesterol  DVT (deep venous thrombosis)  Myocardial infarction: 2005  CAD (coronary artery disease)  S/P hemorrhoidectomy      FAMILY HISTORY:  No pertinent family history in first degree relatives      SOCIAL HISTORY:  unchanged    REVIEW OF SYSTEMS:  CONSTITUTIONAL: No fever, weight loss, or fatigue  EYES: No eye pain, visual disturbances, or discharge  ENMT:  No difficulty hearing, tinnitus, vertigo; No sinus or throat pain  NECK: No pain or stiffness  RESPIRATORY: No cough, wheezing, chills or hemoptysis; No Shortness of Breath  CARDIOVASCULAR: No chest pain, palpitations, passing out, dizziness, or leg swelling  GASTROINTESTINAL: No abdominal or epigastric pain. No nausea, vomiting, or hematemesis; No diarrhea or constipation. No melena or hematochezia.  GENITOURINARY: No dysuria, frequency, hematuria, or incontinence  NEUROLOGICAL: No headaches, memory loss, loss of strength, numbness, or tremors  SKIN: No itching, burning, rashes, or lesions   LYMPH Nodes: No enlarged glands  ENDOCRINE: No heat or cold intolerance; No hair loss  MUSCULOSKELETAL: No joint pain or swelling; No muscle, back, or extremity pain  PSYCHIATRIC: No depression, anxiety, mood swings, or difficulty sleeping  HEME/LYMPH: No easy bruising, or bleeding gums  ALLERY AND IMMUNOLOGIC: No hives or eczema	    [x ] All others negative	  [ ] Unable to obtain    PHYSICAL EXAM:  T(C): 36.8 (05-18-18 @ 04:05), Max: 36.9 (05-17-18 @ 22:30)  HR: 63 (05-18-18 @ 04:05) (63 - 76)  BP: 108/56 (05-18-18 @ 04:05) (106/57 - 108/56)  RR: 20 (05-18-18 @ 04:05) (16 - 20)  SpO2: 97% (05-18-18 @ 04:05) (95% - 98%)  Wt(kg): --  I&O's Summary    17 May 2018 07:01  -  18 May 2018 07:00  --------------------------------------------------------  IN: 0 mL / OUT: 100 mL / NET: -100 mL        Appearance: Normal	  HEENT:   Normal oral mucosa, PERRL, EOMI	  Lymphatic: No lymphadenopathy  Cardiovascular: Normal S1 S2, No JVD, No murmurs, No edema  Respiratory: Lungs clear to auscultation	  Psychiatry: A & O x 3, Mood & affect appropriate  Gastrointestinal:  Soft, Non-tender, + BS	  Skin: No rashes, No ecchymoses, No cyanosis	  Neurologic: Non-focal  Extremities: Normal range of motion, No clubbing, cyanosis or edema  Vascular: Peripheral pulses palpable 2+ bilaterally      LABS:	 	    CBC Full  -  ( 18 May 2018 07:52 )  WBC Count : 7.42 K/uL  Hemoglobin : 8.1 g/dL  Hematocrit : 24.3 %  Platelet Count - Automated : 167 K/uL  Mean Cell Volume : 94.2 fl  Mean Cell Hemoglobin : 31.4 pg  Mean Cell Hemoglobin Concentration : 33.3 gm/dL  Auto Neutrophil # : 5.02 K/uL  Auto Lymphocyte # : 0.93 K/uL  Auto Monocyte # : 0.71 K/uL  Auto Eosinophil # : 0.72 K/uL  Auto Basophil # : 0.03 K/uL  Auto Neutrophil % : 67.7 %  Auto Lymphocyte % : 12.5 %  Auto Monocyte % : 9.6 %  Auto Eosinophil % : 9.7 %  Auto Basophil % : 0.4 %    05-18    146<H>  |  105  |  51<H>  ----------------------------<  105<H>  3.7   |  32<H>  |  1.11  05-17    144  |  106  |  54<H>  ----------------------------<  116<H>  4.0   |  27  |  1.13    Ca    8.7      18 May 2018 05:20  Ca    8.6      17 May 2018 05:19  Phos  3.6     05-18  Phos  3.4     05-17  Mg     2.1     05-18  Mg     2.3     05-17    TPro  5.6<L>  /  Alb  2.9<L>  /  TBili  0.6  /  DBili  x   /  AST  13  /  ALT  14  /  AlkPhos  66  05-18

## 2018-05-18 NOTE — PROGRESS NOTE ADULT - ASSESSMENT
1.  Pulmonary Embolism  2.  Moderate AS  3.  Reduced Ejection Fraction  4.  Atheroslerosis      Plan:  1.  Continue off IV fluids  2.  Start Lasix 20mg PO daily now  3.  No source of bleeding identified - would switch to Eliquis 5mg BID (without a run-in dose of 10mg x 7 days due to age and bleeding risk)  4.  Lets monitor counts over the weekend.        Will follow with you    Dionte Lynch  [phone]

## 2018-05-18 NOTE — PROGRESS NOTE ADULT - PROBLEM SELECTOR PLAN 2
Some pulm edema seen on xray, now s/p 40 mg IV lasix.   Likely in setting of holding home lasix dose  - 20 mg lasix started today.

## 2018-05-18 NOTE — CHART NOTE - NSCHARTNOTEFT_GEN_A_CORE
89 year old female pt with PMH of HFrEF, CAD/MI (2005, s/p PCI), Severe AS, h/o DVT (2014, no longer on Coumadin), s/p PPM, and HLD presenting with L-sided chest discomfort and associated SOB, found to have PE on CTA, addressed with heparin gtt. IVF in order for HARPREET. Noted that pt lives alone/independently PTA, son visits daily to check on her and is involved in care. Due to pt's poor po and malnourished appearance, concern for pt's ability to meal prep/procure groceries, and at times when lethargic feed herself on her own.    Pt seen for malnutrition f/u.     Source: Patient [x]    Family [ ]     other [x] electronic medical records    Diet: DASH/TLC, Ensure Enlive x 3 daily (1050 kcal, 60 grams protein).    Patient reports [ ] nausea  [ ] vomiting [ ] diarrhea [ ] constipation  [ ]chewing problems [ ] swallowing issues  [ ] other:     PO intake:  < 50% [ ] 50-75% [ ]   % [ ]  other :    Source for PO intake [ ] Patient [ ] family [ ] chart [ ] staff [ ] other    Enteral /Parenteral Nutrition:     Current Weight: 138 pounds (current, standing, no edema noted). No change from admit wt 138 pounds.    Pertinent Medications: MEDICATIONS  (STANDING):  aspirin enteric coated 81 milliGRAM(s) Oral daily  atorvastatin 80 milliGRAM(s) Oral at bedtime  carvedilol 3.125 milliGRAM(s) Oral every 12 hours  heparin  Infusion.  Unit(s)/Hr (11 mL/Hr) IV Continuous <Continuous>  sertraline 200 milliGRAM(s) Oral daily    MEDICATIONS  (PRN):  acetaminophen   Tablet. 650 milliGRAM(s) Oral every 6 hours PRN Mild and Moderate Pain (1 - 6)  heparin  Injectable 5000 Unit(s) IV Push every 6 hours PRN For aPTT less than 40  heparin  Injectable 2500 Unit(s) IV Push every 6 hours PRN For aPTT between 40 - 57  LORazepam   Injectable 0.25 milliGRAM(s) IV Push every 6 hours PRN severe agitation  melatonin 3 milliGRAM(s) Oral at bedtime PRN Insomnia  valproate sodium IVPB 125 milliGRAM(s) IV Intermittent every 8 hours PRN Agitation    Pertinent Labs:  05-18 Na146 mmol/L<H> Glu 105 mg/dL<H> K+ 3.7 mmol/L Cr  1.11 mg/dL BUN 51 mg/dL<H> 05-18 Phos 3.6 mg/dL 05-18 Alb 2.9 g/dL<L>      Skin: No pressure ulcers noted      Estimated Needs:   [x] no change since previous assessment  [ ] recalculated:       Previous Nutrition Diagnosis:     [ ] Inadequate Energy Intake [ ]Inadequate Oral Intake [ ] Excessive Energy Intake     [ ] Underweight [ ] Increased Nutrient Needs [ ] Overweight/Obesity     [ ] Altered GI Function [ ] Unintended Weight Loss [ ] Food & Nutrition Related Knowledge Deficit [ ] Malnutrition          Nutrition Diagnosis is [ ] ongoing  [ ] resolved [ ] not applicable          New Nutrition Diagnosis: [ ] not applicable    [ ] Inadequate Protein Energy Intake [ ]Inadequate Oral Intake [ ] Excessive Energy Intake     [ ] Underweight [ ] Increased Nutrient Needs [ ] Overweight/Obesity     [ ] Altered GI Function [ ] Unintended Weight Loss [ ] Food & Nutrition Related Knowledge Deficit[ ] Limited Adherence to nutrition related recommendations [ ] Malnutrition  [ ] other: Free text       Related to:      As evidenced by:      Interventions:     Recommend    [ ] Change Diet To:    [ ] Nutrition Supplement    [ ] Nutrition Support    [ ] Other:        Monitoring and Evaluation:     [ ] PO intake [ ] Tolerance to diet prescription [ ] weights [ ] follow up per protocol    [ ] other: 89 year old female pt with PMH of HFrEF, CAD/MI (2005, s/p PCI), Severe AS, h/o DVT (2014, no longer on Coumadin), s/p PPM, and HLD presenting with L-sided chest discomfort and associated SOB, found to have PE on CTA, addressed with heparin gtt. IVF in order for HARPREET. Noted that pt lives alone/independently PTA, son visits daily to check on her and is involved in care. Due to pt's poor po and malnourished appearance, concern for pt's ability to meal prep/procure groceries, and at times when lethargic feed herself on her own. States at home she "orders food in."    Pt seen for malnutrition f/u. Seen in bedside chair, reports feeling "severely depressed." Endorses poor appetite and does not like to drink the Ensure supplements. Denies new food preferences. Also c/o constipation states she does not remember the last time she had a BM (last documented 5/11), endorses abd. pain.    Source: Patient [x]    Family [ ]     other [x] electronic medical records    Diet: DASH/TLC, Ensure Enlive x 3 daily (1050 kcal, 60 grams protein).    Patient reports [ ] nausea  [ ] vomiting [ ] diarrhea [x] constipation  [ ] chewing problems [ ] swallowing issues  [ ] other:     PO intake (meals):  < 50% [x] 50-75% [ ]   % [ ]  other :  PO intake (supplements): 0%    Source for PO intake [x] Patient [ ] family [x] chart [ ] staff [ ] other    Enteral /Parenteral Nutrition: n/a    Current Weight: 138 pounds (current, standing, no edema noted). No change from admit wt 138 pounds.    Pertinent Medications: MEDICATIONS  (STANDING):  aspirin enteric coated 81 milliGRAM(s) Oral daily  atorvastatin 80 milliGRAM(s) Oral at bedtime  carvedilol 3.125 milliGRAM(s) Oral every 12 hours  heparin  Infusion.  Unit(s)/Hr (11 mL/Hr) IV Continuous <Continuous>  sertraline 200 milliGRAM(s) Oral daily    MEDICATIONS  (PRN):  acetaminophen   Tablet. 650 milliGRAM(s) Oral every 6 hours PRN Mild and Moderate Pain (1 - 6)  heparin  Injectable 5000 Unit(s) IV Push every 6 hours PRN For aPTT less than 40  heparin  Injectable 2500 Unit(s) IV Push every 6 hours PRN For aPTT between 40 - 57  LORazepam   Injectable 0.25 milliGRAM(s) IV Push every 6 hours PRN severe agitation  melatonin 3 milliGRAM(s) Oral at bedtime PRN Insomnia  valproate sodium IVPB 125 milliGRAM(s) IV Intermittent every 8 hours PRN Agitation    Pertinent Labs:  05-18 Na146 mmol/L<H> Glu 105 mg/dL<H> K+ 3.7 mmol/L Cr  1.11 mg/dL BUN 51 mg/dL<H> 05-18 Phos 3.6 mg/dL 05-18 Alb 2.9 g/dL<L>      Skin: No pressure ulcers noted      Estimated Needs:   [x] no change since previous assessment  [ ] recalculated:       Previous Nutrition Diagnosis: Severe malnutrition       Nutrition Diagnosis is [x] ongoing  [ ] resolved [ ] not applicable       New Nutrition Diagnosis: [x] not applicable       Interventions:     1) Recommend liberalize diet to low sodium to provide more food choices  2) Can reduce Ensure to 1 x daily as pt not drinking all three a day  3) Pt seen by psych for reported depressed mood  4) List of grocery delivery/meal delivery programs at pt's bedside (given last visit) for pt/son to review, may be of benefit to pt for home use. Can f/u with SW If interested re: eligibility requirements.  5) Will discuss with team regarding no BM ?1 week?, encouraged that pt eat and drink as able. Emotional support provided.      Monitoring and Evaluation:     [x] PO intake [x] Tolerance to diet prescription [x] weights [x] follow up per protocol    [x] other: RD remains available: Celine Langston MS, RDN, CDN, CDE. #[phone]

## 2018-05-19 LAB
ANION GAP SERPL CALC-SCNC: 8 MMOL/L — SIGNIFICANT CHANGE UP (ref 5–17)
APTT BLD: 31.9 SEC — SIGNIFICANT CHANGE UP (ref 27.5–37.4)
BASOPHILS # BLD AUTO: 0.05 K/UL — SIGNIFICANT CHANGE UP (ref 0–0.2)
BASOPHILS NFR BLD AUTO: 0.6 % — SIGNIFICANT CHANGE UP (ref 0–2)
BUN SERPL-MCNC: 49 MG/DL — HIGH (ref 7–23)
CALCIUM SERPL-MCNC: 8.6 MG/DL — SIGNIFICANT CHANGE UP (ref 8.4–10.5)
CHLORIDE SERPL-SCNC: 102 MMOL/L — SIGNIFICANT CHANGE UP (ref 96–108)
CO2 SERPL-SCNC: 32 MMOL/L — HIGH (ref 22–31)
CREAT SERPL-MCNC: 1.14 MG/DL — SIGNIFICANT CHANGE UP (ref 0.5–1.3)
EOSINOPHIL # BLD AUTO: 0.73 K/UL — HIGH (ref 0–0.5)
EOSINOPHIL NFR BLD AUTO: 8.8 % — HIGH (ref 0–6)
GLUCOSE SERPL-MCNC: 110 MG/DL — HIGH (ref 70–99)
HCT VFR BLD CALC: 27.1 % — LOW (ref 34.5–45)
HGB BLD-MCNC: 8.4 G/DL — LOW (ref 11.5–15.5)
IMM GRANULOCYTES NFR BLD AUTO: 0.2 % — SIGNIFICANT CHANGE UP (ref 0–1.5)
LYMPHOCYTES # BLD AUTO: 1 K/UL — SIGNIFICANT CHANGE UP (ref 1–3.3)
LYMPHOCYTES # BLD AUTO: 12 % — LOW (ref 13–44)
MAGNESIUM SERPL-MCNC: 2.2 MG/DL — SIGNIFICANT CHANGE UP (ref 1.6–2.6)
MCHC RBC-ENTMCNC: 30.1 PG — SIGNIFICANT CHANGE UP (ref 27–34)
MCHC RBC-ENTMCNC: 31 GM/DL — LOW (ref 32–36)
MCV RBC AUTO: 97.1 FL — SIGNIFICANT CHANGE UP (ref 80–100)
MONOCYTES # BLD AUTO: 0.8 K/UL — SIGNIFICANT CHANGE UP (ref 0–0.9)
MONOCYTES NFR BLD AUTO: 9.6 % — SIGNIFICANT CHANGE UP (ref 2–14)
NEUTROPHILS # BLD AUTO: 5.72 K/UL — SIGNIFICANT CHANGE UP (ref 1.8–7.4)
NEUTROPHILS NFR BLD AUTO: 68.8 % — SIGNIFICANT CHANGE UP (ref 43–77)
PHOSPHATE SERPL-MCNC: 3.9 MG/DL — SIGNIFICANT CHANGE UP (ref 2.5–4.5)
PLATELET # BLD AUTO: 189 K/UL — SIGNIFICANT CHANGE UP (ref 150–400)
POTASSIUM SERPL-MCNC: 3.9 MMOL/L — SIGNIFICANT CHANGE UP (ref 3.5–5.3)
POTASSIUM SERPL-SCNC: 3.9 MMOL/L — SIGNIFICANT CHANGE UP (ref 3.5–5.3)
RBC # BLD: 2.79 M/UL — LOW (ref 3.8–5.2)
RBC # FLD: 15.2 % — HIGH (ref 10.3–14.5)
SODIUM SERPL-SCNC: 142 MMOL/L — SIGNIFICANT CHANGE UP (ref 135–145)
WBC # BLD: 8.32 K/UL — SIGNIFICANT CHANGE UP (ref 3.8–10.5)
WBC # FLD AUTO: 8.32 K/UL — SIGNIFICANT CHANGE UP (ref 3.8–10.5)

## 2018-05-19 PROCEDURE — 99233 SBSQ HOSP IP/OBS HIGH 50: CPT | Mod: GC

## 2018-05-19 PROCEDURE — 99232 SBSQ HOSP IP/OBS MODERATE 35: CPT

## 2018-05-19 RX ORDER — FERROUS SULFATE 325(65) MG
325 TABLET ORAL DAILY
Qty: 0 | Refills: 0 | Status: DISCONTINUED | OUTPATIENT
Start: 2018-05-19 | End: 2018-05-21

## 2018-05-19 RX ADMIN — CARVEDILOL PHOSPHATE 3.12 MILLIGRAM(S): 80 CAPSULE, EXTENDED RELEASE ORAL at 16:56

## 2018-05-19 RX ADMIN — Medication 81 MILLIGRAM(S): at 16:56

## 2018-05-19 RX ADMIN — APIXABAN 5 MILLIGRAM(S): 2.5 TABLET, FILM COATED ORAL at 16:56

## 2018-05-19 RX ADMIN — Medication 100 MILLIGRAM(S): at 05:44

## 2018-05-19 RX ADMIN — SERTRALINE 200 MILLIGRAM(S): 25 TABLET, FILM COATED ORAL at 16:57

## 2018-05-19 RX ADMIN — SENNA PLUS 2 TABLET(S): 8.6 TABLET ORAL at 21:05

## 2018-05-19 RX ADMIN — Medication 20 MILLIGRAM(S): at 05:44

## 2018-05-19 RX ADMIN — Medication 100 MILLIGRAM(S): at 21:05

## 2018-05-19 RX ADMIN — Medication 325 MILLIGRAM(S): at 16:59

## 2018-05-19 RX ADMIN — ATORVASTATIN CALCIUM 80 MILLIGRAM(S): 80 TABLET, FILM COATED ORAL at 21:05

## 2018-05-19 RX ADMIN — Medication 100 MILLIGRAM(S): at 16:56

## 2018-05-19 RX ADMIN — POLYETHYLENE GLYCOL 3350 17 GRAM(S): 17 POWDER, FOR SOLUTION ORAL at 16:56

## 2018-05-19 NOTE — PROGRESS NOTE ADULT - PROBLEM SELECTOR PLAN 3
Pt with HARPREET this am, likely from contrast induced nephropathy from CTA, now improved Pt with HARPREET this am, likely from contrast induced nephropathy from CTA, now improved  -Monitor BMP daily.

## 2018-05-19 NOTE — PROGRESS NOTE ADULT - PROBLEM SELECTOR PLAN 6
- ASA 81  - c/w coreg with hold parameters. - ASA 81mg daily  - c/w coreg with hold parameters.  -C/w atorvastatin.

## 2018-05-19 NOTE — PROGRESS NOTE ADULT - SUBJECTIVE AND OBJECTIVE BOX
Rachael Ashford MD, PhD  PGY-1| Internal Medicine  [phone] / [phone]  Team 1  ==================    Patient is a 89y old  Female who presents with a chief complaint of Pulmonary Embolism (11 May 2018 18:51)    Interval History: Pt examined at bedside.  No acute events overnight.  Pt still unhappy this am and feels that she is sitting around.  Denies sob this am.  Denies f/c/n/v.  Pt cannot remember the last time she had a bm.  Started on apixaban yesterday.    MEDICATIONS  (STANDING):  apixaban 5 milliGRAM(s) Oral every 12 hours  aspirin enteric coated 81 milliGRAM(s) Oral daily  atorvastatin 80 milliGRAM(s) Oral at bedtime  carvedilol 3.125 milliGRAM(s) Oral every 12 hours  docusate sodium 100 milliGRAM(s) Oral three times a day  furosemide    Tablet 20 milliGRAM(s) Oral daily  senna 2 Tablet(s) Oral at bedtime  sertraline 200 milliGRAM(s) Oral daily    MEDICATIONS  (PRN):  acetaminophen   Tablet. 650 milliGRAM(s) Oral every 6 hours PRN Mild and Moderate Pain (1 - 6)  LORazepam   Injectable 0.25 milliGRAM(s) IV Push every 6 hours PRN severe agitation  melatonin 3 milliGRAM(s) Oral at bedtime PRN Insomnia  polyethylene glycol 3350 17 Gram(s) Oral daily PRN Constipation  valproate sodium IVPB 125 milliGRAM(s) IV Intermittent every 8 hours PRN Agitation      Objective    Vital Signs Last 24 Hrs  T(C): 36.3 (19 May 2018 05:05), Max: 36.6 (18 May 2018 21:45)  T(F): 97.4 (19 May 2018 05:05), Max: 97.8 (18 May 2018 21:45)  HR: 67 (19 May 2018 05:05) (67 - 71)  BP: 105/63 (19 May 2018 05:05) (95/42 - 105/66)  BP(mean): --  RR: 18 (19 May 2018 05:05) (18 - 20)  SpO2: 98% (19 May 2018 05:05) (93% - 98%)      CAPILLARY BLOOD GLUCOSE            05-18-18 @ 07:01  -  05-19-18 @ 07:00  --------------------------------------------------------  IN: 840 mL / OUT: 350 mL / NET: 490 mL        Appearance: Laying in bed, NAD  HEENT:   Normal oral mucosa, PERRL, EOMI, anicteric sclera  Cardiovascular: RRR, systolic murmur noted in left upper sternal border, no gallops or rubs appreciated  Respiratory: Lungs clear to auscultation bilaterally, no wheezes, crackles appreciated  Gastrointestinal:  Soft, nondistended, nontender, +BS	  Skin: eccymossi noted on right arm, none on legs; no large areas of eccymosis noted.  Neurologic: AOx3, CNII-XII grossly intact, motor and sensory function grossly intact  Extremities: Moving all extremities equally; no edema  Psych:  Normal affect, responds to questions appropriately; slightly depressed mood today.      05-19    142  |  102  |  49<H>  ----------------------------<  110<H>  3.9   |  32<H>  |  1.14  05-18    146<H>  |  105  |  51<H>  ----------------------------<  105<H>  3.7   |  32<H>  |  1.11  05-17    144  |  106  |  54<H>  ----------------------------<  116<H>  4.0   |  27  |  1.13    Ca    8.6      19 May 2018 06:25  Ca    8.7      18 May 2018 05:20  Ca    8.6      17 May 2018 05:19  Phos  3.9     05-19  Mg     2.2     05-19    TPro  5.6<L>  /  Alb  2.9<L>  /  TBili  0.6  /  DBili  x   /  AST  13  /  ALT  14  /  AlkPhos  66  05-18      PTT - ( 19 May 2018 08:46 )  PTT:31.9 sec                                        8.4    8.32  )-----------( 189      ( 19 May 2018 08:46 )             27.1                         8.1    7.42  )-----------( 167      ( 18 May 2018 07:52 )             24.3                         8.6    8.93  )-----------( 167      ( 17 May 2018 07:52 )             26.7     CAPILLARY BLOOD GLUCOSE              Radiology & Imaging    Imaging Personally Reviewed:    Consultant Notes Reviewed

## 2018-05-19 NOTE — PROGRESS NOTE ADULT - PROBLEM SELECTOR PLAN 1
Pt with acute drop in Hb to 8.1 now 8.6 this am.  Pt continuing to have intermittent epitaxis  - No sources found at this point  - Removed NC this am in hopes to stop epitaxis.  - Eliquis started, will monitor. Pt with acute drop in Hb to 8.1 now 8.4 this am.  Pt continuing to have intermittent epitaxis  - No sources found at this point  - Removed NC this am in hopes to stop epistaxis.  - Eliquis started, will monitor.  -Iron studies show possible iron deficiency anemia. Will check ferritin. Will start ferrous sulfate 325mg PO daily.  -Bowel regimen while on iron supplements.

## 2018-05-19 NOTE — PROGRESS NOTE ADULT - ATTENDING COMMENTS
-Patient seen/examined on 5/19/18. Case/plan discussed with resident as reviewed/edited by me above.

## 2018-05-19 NOTE — PROGRESS NOTE ADULT - PROBLEM SELECTOR PLAN 2
Some pulm edema seen on xray, now s/p 40 mg IV lasix.   Likely in setting of holding home lasix dose  - 20 mg lasix started with improved lung sounds today Some pulm edema seen on xray, now s/p 40 mg IV lasix. Likely in setting of holding home lasix dose  - 20 mg lasix started with improved lung sounds today

## 2018-05-19 NOTE — PROGRESS NOTE ADULT - ASSESSMENT
1.  Pulmonary Embolism  2.  Moderate AS  3.  Reduced Ejection Fraction  4.  Atheroslerosis      Plan:  1  Continue Lasix 20mg PO daily   2  Continue with Eliquis 5mg BID (without a run-in dose of 10mg x 7 days due to age and bleeding risk)  3  Lets monitor counts over the weekend.  4.  For CAD, continue statin, betablocker and antiplatelet therapy.  BP and HR well controlled      Will follow with you    Dionte Lynch  [phone]

## 2018-05-19 NOTE — PROGRESS NOTE ADULT - ASSESSMENT
89 Y/O/ F with PMH of HFrEF (EF ~45%), CAD/MI (2005, s/p PCI), Severe AS, h/o DVT (2014, no longer on Coumadin), s/p PPM ([employer]), and HLD p/w CP/SOB found to have CT evidence of submassive R-sided PE w/ elevated BNP, now on hep gtt improving. Currently in no pain; pt now with HARPREET resolving like SARAH; pt now with drop in Hb, no source noted, skin exam today also showing no source.  Hb now improving with transition to Eliquis, plan for dc to rehab in next few days.

## 2018-05-19 NOTE — PROGRESS NOTE ADULT - PROBLEM SELECTOR PLAN 5
- Transitioned to Eliquis today.  - pain resolved.  - removed NC today, will monitor sats off. - Transitioned to Eliquis yesterday.  - pain resolved.  - removed NC today, will monitor sats off.

## 2018-05-19 NOTE — PROGRESS NOTE ADULT - SUBJECTIVE AND OBJECTIVE BOX
PAGER:  [phone]               [facility] [address]              EMAIL [email or web address]   OFFICE [phone]                              ********VASCULAR MEDICINE & CARDIOLOGY PROGRESS NOTE********                        CC:  PE    INTERVAL HISTORY:  No events.  Started on eliquis.  Hemoglobin remains stable.       MEDICATIONS  (STANDING):  apixaban 5 milliGRAM(s) Oral every 12 hours  aspirin enteric coated 81 milliGRAM(s) Oral daily  atorvastatin 80 milliGRAM(s) Oral at bedtime  carvedilol 3.125 milliGRAM(s) Oral every 12 hours  docusate sodium 100 milliGRAM(s) Oral three times a day  ferrous    sulfate 325 milliGRAM(s) Oral daily  furosemide    Tablet 20 milliGRAM(s) Oral daily  senna 2 Tablet(s) Oral at bedtime  sertraline 200 milliGRAM(s) Oral daily    MEDICATIONS  (PRN):  acetaminophen   Tablet. 650 milliGRAM(s) Oral every 6 hours PRN Mild and Moderate Pain (1 - 6)  LORazepam   Injectable 0.25 milliGRAM(s) IV Push every 6 hours PRN severe agitation  melatonin 3 milliGRAM(s) Oral at bedtime PRN Insomnia  polyethylene glycol 3350 17 Gram(s) Oral daily PRN Constipation  valproate sodium IVPB 125 milliGRAM(s) IV Intermittent every 8 hours PRN Agitation    PAST MEDICAL & SURGICAL HISTORY:  Depression  High cholesterol  DVT (deep venous thrombosis)  Myocardial infarction: 2005  CAD (coronary artery disease)  S/P hemorrhoidectomy      FAMILY HISTORY:  No pertinent family history in first degree relatives      SOCIAL HISTORY:  unchanged    REVIEW OF SYSTEMS:  CONSTITUTIONAL: No fever, weight loss, or fatigue  EYES: No eye pain, visual disturbances, or discharge  ENMT:  No difficulty hearing, tinnitus, vertigo; No sinus or throat pain  NECK: No pain or stiffness  RESPIRATORY: No cough, wheezing, chills or hemoptysis; No Shortness of Breath  CARDIOVASCULAR: No chest pain, palpitations, passing out, dizziness, or leg swelling  GASTROINTESTINAL: No abdominal or epigastric pain. No nausea, vomiting, or hematemesis; No diarrhea or constipation. No melena or hematochezia.  GENITOURINARY: No dysuria, frequency, hematuria, or incontinence  NEUROLOGICAL: No headaches, memory loss, loss of strength, numbness, or tremors  SKIN: No itching, burning, rashes, or lesions   LYMPH Nodes: No enlarged glands  ENDOCRINE: No heat or cold intolerance; No hair loss  MUSCULOSKELETAL: No joint pain or swelling; No muscle, back, or extremity pain  PSYCHIATRIC: No depression, anxiety, mood swings, or difficulty sleeping  HEME/LYMPH: No easy bruising, or bleeding gums  ALLERY AND IMMUNOLOGIC: No hives or eczema	    [x ] All others negative	  [ ] Unable to obtain    ICU Vital Signs Last 24 Hrs  T(C): 36.7 (19 May 2018 12:55), Max: 36.7 (19 May 2018 12:46)  T(F): 98.1 (19 May 2018 12:55), Max: 98.1 (19 May 2018 12:46)  HR: 71 (19 May 2018 12:55) (67 - 71)  BP: 105/54 (19 May 2018 12:55) (105/54 - 105/66)  BP(mean): --  ABP: --  ABP(mean): --  RR: 18 (19 May 2018 12:55) (18 - 19)  SpO2: 98% (19 May 2018 12:55) (97% - 98%)        Appearance: Normal	  HEENT:   Normal oral mucosa, PERRL, EOMI	  Lymphatic: No lymphadenopathy  Cardiovascular: Normal S1 S2, No JVD, No murmurs, No edema  Respiratory: Lungs clear to auscultation	  Psychiatry: A & O x 3, Mood & affect appropriate  Gastrointestinal:  Soft, Non-tender, + BS	  Skin: No rashes, No ecchymoses, No cyanosis	  Neurologic: Non-focal  Extremities: Normal range of motion, No clubbing, cyanosis or edema  Vascular: Peripheral pulses palpable 2+ bilaterally                             8.4    8.32  )-----------( 189      ( 19 May 2018 08:46 )             27.1   05-19    142  |  102  |  49<H>  ----------------------------<  110<H>  3.9   |  32<H>  |  1.14    Ca    8.6      19 May 2018 06:25  Phos  3.9     05-19  Mg     2.2     05-19    TPro  5.6<L>  /  Alb  2.9<L>  /  TBili  0.6  /  DBili  x   /  AST  13  /  ALT  14  /  AlkPhos  66  05-18

## 2018-05-20 LAB
ANION GAP SERPL CALC-SCNC: 10 MMOL/L — SIGNIFICANT CHANGE UP (ref 5–17)
BUN SERPL-MCNC: 41 MG/DL — HIGH (ref 7–23)
CALCIUM SERPL-MCNC: 8.7 MG/DL — SIGNIFICANT CHANGE UP (ref 8.4–10.5)
CHLORIDE SERPL-SCNC: 101 MMOL/L — SIGNIFICANT CHANGE UP (ref 96–108)
CO2 SERPL-SCNC: 34 MMOL/L — HIGH (ref 22–31)
CREAT SERPL-MCNC: 1.11 MG/DL — SIGNIFICANT CHANGE UP (ref 0.5–1.3)
FERRITIN SERPL-MCNC: 130 NG/ML — SIGNIFICANT CHANGE UP (ref 15–150)
GLUCOSE SERPL-MCNC: 128 MG/DL — HIGH (ref 70–99)
HCT VFR BLD CALC: 26.9 % — LOW (ref 34.5–45)
HGB BLD-MCNC: 8.7 G/DL — LOW (ref 11.5–15.5)
MAGNESIUM SERPL-MCNC: 2.2 MG/DL — SIGNIFICANT CHANGE UP (ref 1.6–2.6)
MCHC RBC-ENTMCNC: 30.9 PG — SIGNIFICANT CHANGE UP (ref 27–34)
MCHC RBC-ENTMCNC: 32.3 GM/DL — SIGNIFICANT CHANGE UP (ref 32–36)
MCV RBC AUTO: 95.4 FL — SIGNIFICANT CHANGE UP (ref 80–100)
PHOSPHATE SERPL-MCNC: 3.7 MG/DL — SIGNIFICANT CHANGE UP (ref 2.5–4.5)
PLATELET # BLD AUTO: 186 K/UL — SIGNIFICANT CHANGE UP (ref 150–400)
POTASSIUM SERPL-MCNC: 3.7 MMOL/L — SIGNIFICANT CHANGE UP (ref 3.5–5.3)
POTASSIUM SERPL-SCNC: 3.7 MMOL/L — SIGNIFICANT CHANGE UP (ref 3.5–5.3)
RBC # BLD: 2.82 M/UL — LOW (ref 3.8–5.2)
RBC # FLD: 15.4 % — HIGH (ref 10.3–14.5)
SODIUM SERPL-SCNC: 145 MMOL/L — SIGNIFICANT CHANGE UP (ref 135–145)
WBC # BLD: 9.88 K/UL — SIGNIFICANT CHANGE UP (ref 3.8–10.5)
WBC # FLD AUTO: 9.88 K/UL — SIGNIFICANT CHANGE UP (ref 3.8–10.5)

## 2018-05-20 PROCEDURE — 99233 SBSQ HOSP IP/OBS HIGH 50: CPT | Mod: GC

## 2018-05-20 PROCEDURE — 99232 SBSQ HOSP IP/OBS MODERATE 35: CPT

## 2018-05-20 RX ORDER — FUROSEMIDE 40 MG
20 TABLET ORAL
Qty: 0 | Refills: 0 | Status: DISCONTINUED | OUTPATIENT
Start: 2018-05-20 | End: 2018-05-21

## 2018-05-20 RX ADMIN — SENNA PLUS 2 TABLET(S): 8.6 TABLET ORAL at 21:23

## 2018-05-20 RX ADMIN — Medication 100 MILLIGRAM(S): at 11:50

## 2018-05-20 RX ADMIN — CARVEDILOL PHOSPHATE 3.12 MILLIGRAM(S): 80 CAPSULE, EXTENDED RELEASE ORAL at 11:50

## 2018-05-20 RX ADMIN — Medication 325 MILLIGRAM(S): at 11:50

## 2018-05-20 RX ADMIN — APIXABAN 5 MILLIGRAM(S): 2.5 TABLET, FILM COATED ORAL at 23:11

## 2018-05-20 RX ADMIN — SERTRALINE 200 MILLIGRAM(S): 25 TABLET, FILM COATED ORAL at 11:50

## 2018-05-20 RX ADMIN — Medication 81 MILLIGRAM(S): at 11:50

## 2018-05-20 RX ADMIN — CARVEDILOL PHOSPHATE 3.12 MILLIGRAM(S): 80 CAPSULE, EXTENDED RELEASE ORAL at 23:10

## 2018-05-20 RX ADMIN — APIXABAN 5 MILLIGRAM(S): 2.5 TABLET, FILM COATED ORAL at 11:50

## 2018-05-20 RX ADMIN — APIXABAN 5 MILLIGRAM(S): 2.5 TABLET, FILM COATED ORAL at 00:07

## 2018-05-20 RX ADMIN — Medication 100 MILLIGRAM(S): at 21:22

## 2018-05-20 RX ADMIN — ATORVASTATIN CALCIUM 80 MILLIGRAM(S): 80 TABLET, FILM COATED ORAL at 21:22

## 2018-05-20 RX ADMIN — Medication 650 MILLIGRAM(S): at 12:20

## 2018-05-20 RX ADMIN — Medication 20 MILLIGRAM(S): at 05:04

## 2018-05-20 RX ADMIN — Medication 650 MILLIGRAM(S): at 11:51

## 2018-05-20 NOTE — PROGRESS NOTE ADULT - ASSESSMENT
89 Y/O/ F with PMH of HFrEF (EF ~45%), CAD/MI (2005, s/p PCI), Severe AS, h/o DVT (2014, no longer on Coumadin), s/p PPM ([employer]), and HLD p/w CP/SOB found to have CT evidence of submassive R-sided PE w/ elevated BNP, now on hep gtt improving. Currently in no pain; pt now with HARPREET resolving like SARAH; pt now with drop in Hb, no source noted, skin exam today also showing no source.  Hb now improving with transition to Eliquis, plan for dc to rehab in next few days.

## 2018-05-20 NOTE — PROGRESS NOTE ADULT - PROBLEM SELECTOR PLAN 5
- Transitioned to Eliquis yesterday.  - pain resolved.  - removed NC today, will monitor sats off. - Transitioned to Eliquis.  - pain resolved.  - removed NC, will monitor sats off.

## 2018-05-20 NOTE — PROGRESS NOTE ADULT - ASSESSMENT
1.  Pulmonary Embolism  2.  Moderate AS  3.  Reduced Ejection Fraction  4.  Atheroslerosis      Plan:  1  Please decrease lasix to 20mg every other day  2  Continue with Eliquis 5mg BID (without a run-in dose of 10mg x 7 days due to age and bleeding risk)  3  Continue to monitor CBC  4.  For CAD, continue statin, betablocker and antiplatelet therapy.  HR well controlled.       Will follow with you    Dionte Lynch  [phone]

## 2018-05-20 NOTE — PROGRESS NOTE ADULT - SUBJECTIVE AND OBJECTIVE BOX
Patient is a 89y old  Female who presents with a chief complaint of Pulmonary Embolism (11 May 2018 18:51)      INTERVAL HPI/OVERNIGHT EVENTS: No acute events overnight. No tele events. Patient denies SOB, CP this AM. Counts stable on Eliquis.    T(C): 36.3 (05-20-18 @ 04:27), Max: 36.7 (05-19-18 @ 12:46)  HR: 62 (05-20-18 @ 04:27) (62 - 73)  BP: 105/59 (05-20-18 @ 04:27) (92/48 - 105/59)  RR: 18 (05-20-18 @ 04:27) (17 - 19)  SpO2: 96% (05-20-18 @ 04:27) (96% - 98%)  Wt(kg): --  I&O's Summary    19 May 2018 07:01  -  20 May 2018 07:00  --------------------------------------------------------  IN: 540 mL / OUT: 200 mL / NET: 340 mL        LABS:                        8.7    9.88  )-----------( 186      ( 20 May 2018 07:47 )             26.9     05-20    145  |  101  |  41<H>  ----------------------------<  128<H>  3.7   |  34<H>  |  1.11    Ca    8.7      20 May 2018 06:32  Phos  3.7     05-20  Mg     2.2     05-20      PTT - ( 19 May 2018 08:46 )  PTT:31.9 sec    CAPILLARY BLOOD GLUCOSE              REVIEW OF SYSTEMS:  CONSTITUTIONAL: No fever, weight loss, or fatigue  EYES: No eye pain, visual disturbances, or discharge  ENMT:  No difficulty hearing, tinnitus, vertigo; No sinus or throat pain  NECK: No pain or stiffness  BREASTS: No pain, masses, or nipple discharge  RESPIRATORY: No cough, wheezing, chills or hemoptysis; No shortness of breath  CARDIOVASCULAR: No chest pain, palpitations, dizziness, or leg swelling  GASTROINTESTINAL: No abdominal or epigastric pain. No nausea, vomiting, or hematemesis; No diarrhea or constipation. No melena or hematochezia.  GENITOURINARY: No dysuria, frequency, hematuria, or incontinence  NEUROLOGICAL: No headaches, memory loss, loss of strength, numbness, or tremors  SKIN: No itching, burning, rashes, or lesions   LYMPH NODES: No enlarged glands  ENDOCRINE: No heat or cold intolerance; No hair loss  MUSCULOSKELETAL: No joint pain or swelling; No muscle, back, or extremity pain  PSYCHIATRIC: No depression, anxiety, mood swings, or difficulty sleeping  HEME/LYMPH: No easy bruising, or bleeding gums  ALLERY AND IMMUNOLOGIC: No hives or eczema    RADIOLOGY & ADDITIONAL TESTS:    Imaging Personally Reviewed:  [ ] YES  [x ] NO    Consultant(s) Notes Reviewed:  [x] YES  [ ] NO, Cardiology    PHYSICAL EXAM:  GENERAL: NAD, well-groomed, well-developed  HEAD:  Atraumatic, Normocephalic  EYES: EOMI, PERRLA, conjunctiva and sclera clear  ENMT: No tonsillar erythema, exudates, or enlargement; Moist mucous membranes, Good dentition, No lesions  NECK: Supple, No JVD, Normal thyroid  NERVOUS SYSTEM:  Alert & Oriented X3, Good concentration; Motor Strength 5/5 B/L upper and lower extremities; DTRs 2+ intact and symmetric  CHEST/LUNG: Clear to auscultation bilaterally; No rales, rhonchi, wheezing, or rubs  HEART: Regular rate and rhythm; No murmurs, rubs, or gallops  ABDOMEN: Soft, Nontender, Nondistended; Bowel sounds present  EXTREMITIES:  2+ Peripheral Pulses, No clubbing, cyanosis, or edema  LYMPH: No lymphadenopathy noted  SKIN: No rashes or lesions    Care Discussed with Consultants/Other Providers [ ] YES  [x] NO

## 2018-05-20 NOTE — PROGRESS NOTE ADULT - PROBLEM SELECTOR PLAN 1
- Pt with acute drop in Hb to 8.1 now 8.4 yesterday, 8.7 today.  Pt continuing to have intermittent epitaxis  - No sources found at this point  - Removed NC this am in hopes to stop epistaxis.  - Eliquis started, will monitor.  -Iron studies show possible iron deficiency anemia. Will check ferritin. Will start ferrous sulfate 325mg PO daily.  -Bowel regimen while on iron supplements. - Pt with acute drop in Hb to 8.1 then was 8.4 yesterday, 8.7 today.  Pt continuing to have intermittent epistaxis  - No sources found at this point  - Removed NC in hopes to stop epistaxis.  - Eliquis started, will monitor.  -Iron studies show iron deficiency anemia. C/w ferrous sulfate 325mg PO daily.  -Bowel regimen while on iron supplements.

## 2018-05-20 NOTE — PROGRESS NOTE ADULT - SUBJECTIVE AND OBJECTIVE BOX
PAGER:  [phone]               [facility] [address]              EMAIL [email or web address]   OFFICE [phone]                              ********VASCULAR MEDICINE & CARDIOLOGY PROGRESS NOTE********                        CC:  PE    INTERVAL HISTORY:  No events. Remains on  eliquis.  Hemoglobin remains stable.  No CP or SOB>     MEDICATIONS  (STANDING):  apixaban 5 milliGRAM(s) Oral every 12 hours  aspirin enteric coated 81 milliGRAM(s) Oral daily  atorvastatin 80 milliGRAM(s) Oral at bedtime  carvedilol 3.125 milliGRAM(s) Oral every 12 hours  docusate sodium 100 milliGRAM(s) Oral three times a day  ferrous    sulfate 325 milliGRAM(s) Oral daily  furosemide    Tablet 20 milliGRAM(s) Oral daily  senna 2 Tablet(s) Oral at bedtime  sertraline 200 milliGRAM(s) Oral daily    MEDICATIONS  (PRN):  acetaminophen   Tablet. 650 milliGRAM(s) Oral every 6 hours PRN Mild and Moderate Pain (1 - 6)  LORazepam   Injectable 0.25 milliGRAM(s) IV Push every 6 hours PRN severe agitation  melatonin 3 milliGRAM(s) Oral at bedtime PRN Insomnia  polyethylene glycol 3350 17 Gram(s) Oral daily PRN Constipation  valproate sodium IVPB 125 milliGRAM(s) IV Intermittent every 8 hours PRN Agitation    PAST MEDICAL & SURGICAL HISTORY:  Depression  High cholesterol  DVT (deep venous thrombosis)  Myocardial infarction: 2005  CAD (coronary artery disease)  S/P hemorrhoidectomy      FAMILY HISTORY:  No pertinent family history in first degree relatives      SOCIAL HISTORY:  unchanged    REVIEW OF SYSTEMS:  CONSTITUTIONAL: No fever, weight loss, or fatigue  EYES: No eye pain, visual disturbances, or discharge  ENMT:  No difficulty hearing, tinnitus, vertigo; No sinus or throat pain  NECK: No pain or stiffness  RESPIRATORY: No cough, wheezing, chills or hemoptysis; No Shortness of Breath  CARDIOVASCULAR: No chest pain, palpitations, passing out, dizziness, or leg swelling  GASTROINTESTINAL: No abdominal or epigastric pain. No nausea, vomiting, or hematemesis; No diarrhea or constipation. No melena or hematochezia.  GENITOURINARY: No dysuria, frequency, hematuria, or incontinence  NEUROLOGICAL: No headaches, memory loss, loss of strength, numbness, or tremors  SKIN: No itching, burning, rashes, or lesions   LYMPH Nodes: No enlarged glands  ENDOCRINE: No heat or cold intolerance; No hair loss  MUSCULOSKELETAL: No joint pain or swelling; No muscle, back, or extremity pain  PSYCHIATRIC: No depression, anxiety, mood swings, or difficulty sleeping  HEME/LYMPH: No easy bruising, or bleeding gums  ALLERY AND IMMUNOLOGIC: No hives or eczema	    [x ] All others negative	  [ ] Unable to obtain    Vital Signs Last 24 Hrs  T(C): 36.5 (20 May 2018 14:57), Max: 36.5 (20 May 2018 14:57)  T(F): 97.7 (20 May 2018 14:57), Max: 97.7 (20 May 2018 14:57)  HR: 60 (20 May 2018 14:57) (60 - 73)  BP: 75/40 (20 May 2018 14:57) (75/40 - 105/59)  BP(mean): --  RR: 16 (20 May 2018 14:57) (16 - 18)  SpO2: 97% (20 May 2018 14:57) (96% - 97%)        Appearance: Normal	  HEENT:   Normal oral mucosa, PERRL, EOMI	  Lymphatic: No lymphadenopathy  Cardiovascular: Normal S1 S2, No JVD, No murmurs, No edema  Respiratory: Lungs clear to auscultation	  Psychiatry: A & O x 3, Mood & affect appropriate  Gastrointestinal:  Soft, Non-tender, + BS	  Skin: No rashes, No ecchymoses, No cyanosis	  Neurologic: Non-focal  Extremities: Normal range of motion, No clubbing, cyanosis or edema  Vascular: Peripheral pulses palpable 2+ bilaterally                                      8.7    9.88  )-----------( 186      ( 20 May 2018 07:47 )             26.9   05-20    145  |  101  |  41<H>  ----------------------------<  128<H>  3.7   |  34<H>  |  1.11    Ca    8.7      20 May 2018 06:32  Phos  3.7     05-20  Mg     2.2     05-20

## 2018-05-20 NOTE — PROGRESS NOTE ADULT - PROBLEM SELECTOR PLAN 2
- Some pulm edema seen on xray, now s/p 40 mg IV lasix. Likely in setting of holding home lasix dose  - 20 mg lasix started with improved lung sounds today - Some pulm edema seen on xray, now s/p 40 mg IV lasix. Likely in setting of holding home lasix dose  - 20 mg lasix PO changed to QOD per cardio recs.

## 2018-05-20 NOTE — PROGRESS NOTE ADULT - PROBLEM SELECTOR PLAN 4
- Pt with history of depression, now on sertraline. Pt very afraid of dying, despite reassurance does get anxious.    - Will need further eval prior to discharge per psych  - c/w sertraline

## 2018-05-20 NOTE — PROGRESS NOTE ADULT - PROBLEM SELECTOR PLAN 8
-DVT PPX: on Eliquis  -DISPO: Rehab  -CODE STATUS: Full Code    Kenny Shell, PGY-2  [phone]  After 7PM on weekdays and 12PM on weekends please page [phone]

## 2018-05-20 NOTE — PROGRESS NOTE ADULT - PROBLEM SELECTOR PLAN 3
- Pt with HARPREET, likely from contrast induced nephropathy from CTA, now improved  -Monitor BMP daily.

## 2018-05-20 NOTE — PROGRESS NOTE ADULT - PROBLEM SELECTOR PLAN 7
- Home dose of lasix 20 mg qd started -DVT PPX: on Eliquis  -DISPO: Rehab  -CODE STATUS: Full Code    Kenny Shell, PGY-2  [phone]  After 7PM on weekdays and 12PM on weekends please page [phone]

## 2018-05-20 NOTE — PROGRESS NOTE ADULT - ATTENDING COMMENTS
-Changed lasix to 20mg PO every other day, per cardio recs.   -Needs psych f/u prior to DC to rehab.   -H/H stable on Eliquis.

## 2018-05-21 VITALS
HEART RATE: 59 BPM | SYSTOLIC BLOOD PRESSURE: 91 MMHG | RESPIRATION RATE: 17 BRPM | OXYGEN SATURATION: 100 % | TEMPERATURE: 98 F | DIASTOLIC BLOOD PRESSURE: 61 MMHG

## 2018-05-21 DIAGNOSIS — I26.99 OTHER PULMONARY EMBOLISM WITHOUT ACUTE COR PULMONALE: ICD-10-CM

## 2018-05-21 LAB
ANION GAP SERPL CALC-SCNC: 8 MMOL/L — SIGNIFICANT CHANGE UP (ref 5–17)
BUN SERPL-MCNC: 39 MG/DL — HIGH (ref 7–23)
CALCIUM SERPL-MCNC: 8.6 MG/DL — SIGNIFICANT CHANGE UP (ref 8.4–10.5)
CHLORIDE SERPL-SCNC: 100 MMOL/L — SIGNIFICANT CHANGE UP (ref 96–108)
CO2 SERPL-SCNC: 35 MMOL/L — HIGH (ref 22–31)
CREAT SERPL-MCNC: 1.16 MG/DL — SIGNIFICANT CHANGE UP (ref 0.5–1.3)
GLUCOSE SERPL-MCNC: 122 MG/DL — HIGH (ref 70–99)
HCT VFR BLD CALC: 26.2 % — LOW (ref 34.5–45)
HGB BLD-MCNC: 8.3 G/DL — LOW (ref 11.5–15.5)
MAGNESIUM SERPL-MCNC: 2.2 MG/DL — SIGNIFICANT CHANGE UP (ref 1.6–2.6)
MCHC RBC-ENTMCNC: 30.4 PG — SIGNIFICANT CHANGE UP (ref 27–34)
MCHC RBC-ENTMCNC: 31.7 GM/DL — LOW (ref 32–36)
MCV RBC AUTO: 96 FL — SIGNIFICANT CHANGE UP (ref 80–100)
PHOSPHATE SERPL-MCNC: 3 MG/DL — SIGNIFICANT CHANGE UP (ref 2.5–4.5)
PLATELET # BLD AUTO: 187 K/UL — SIGNIFICANT CHANGE UP (ref 150–400)
POTASSIUM SERPL-MCNC: 3.7 MMOL/L — SIGNIFICANT CHANGE UP (ref 3.5–5.3)
POTASSIUM SERPL-SCNC: 3.7 MMOL/L — SIGNIFICANT CHANGE UP (ref 3.5–5.3)
RBC # BLD: 2.73 M/UL — LOW (ref 3.8–5.2)
RBC # FLD: 15.4 % — HIGH (ref 10.3–14.5)
SODIUM SERPL-SCNC: 143 MMOL/L — SIGNIFICANT CHANGE UP (ref 135–145)
WBC # BLD: 9.01 K/UL — SIGNIFICANT CHANGE UP (ref 3.8–10.5)
WBC # FLD AUTO: 9.01 K/UL — SIGNIFICANT CHANGE UP (ref 3.8–10.5)

## 2018-05-21 PROCEDURE — 97162 PT EVAL MOD COMPLEX 30 MIN: CPT

## 2018-05-21 PROCEDURE — 80053 COMPREHEN METABOLIC PANEL: CPT

## 2018-05-21 PROCEDURE — 97116 GAIT TRAINING THERAPY: CPT

## 2018-05-21 PROCEDURE — 74176 CT ABD & PELVIS W/O CONTRAST: CPT

## 2018-05-21 PROCEDURE — 87086 URINE CULTURE/COLONY COUNT: CPT

## 2018-05-21 PROCEDURE — 99232 SBSQ HOSP IP/OBS MODERATE 35: CPT

## 2018-05-21 PROCEDURE — 80048 BASIC METABOLIC PNL TOTAL CA: CPT

## 2018-05-21 PROCEDURE — 87633 RESP VIRUS 12-25 TARGETS: CPT

## 2018-05-21 PROCEDURE — 85730 THROMBOPLASTIN TIME PARTIAL: CPT

## 2018-05-21 PROCEDURE — 81001 URINALYSIS AUTO W/SCOPE: CPT

## 2018-05-21 PROCEDURE — 82550 ASSAY OF CK (CPK): CPT

## 2018-05-21 PROCEDURE — 85027 COMPLETE CBC AUTOMATED: CPT

## 2018-05-21 PROCEDURE — 84443 ASSAY THYROID STIM HORMONE: CPT

## 2018-05-21 PROCEDURE — 83605 ASSAY OF LACTIC ACID: CPT

## 2018-05-21 PROCEDURE — 93306 TTE W/DOPPLER COMPLETE: CPT

## 2018-05-21 PROCEDURE — 83615 LACTATE (LD) (LDH) ENZYME: CPT

## 2018-05-21 PROCEDURE — 84540 ASSAY OF URINE/UREA-N: CPT

## 2018-05-21 PROCEDURE — 85014 HEMATOCRIT: CPT

## 2018-05-21 PROCEDURE — 84484 ASSAY OF TROPONIN QUANT: CPT

## 2018-05-21 PROCEDURE — 82553 CREATINE MB FRACTION: CPT

## 2018-05-21 PROCEDURE — 82803 BLOOD GASES ANY COMBINATION: CPT

## 2018-05-21 PROCEDURE — 82570 ASSAY OF URINE CREATININE: CPT

## 2018-05-21 PROCEDURE — 71275 CT ANGIOGRAPHY CHEST: CPT

## 2018-05-21 PROCEDURE — 97110 THERAPEUTIC EXERCISES: CPT

## 2018-05-21 PROCEDURE — 82947 ASSAY GLUCOSE BLOOD QUANT: CPT

## 2018-05-21 PROCEDURE — 84100 ASSAY OF PHOSPHORUS: CPT

## 2018-05-21 PROCEDURE — 82746 ASSAY OF FOLIC ACID SERUM: CPT

## 2018-05-21 PROCEDURE — 87581 M.PNEUMON DNA AMP PROBE: CPT

## 2018-05-21 PROCEDURE — 82435 ASSAY OF BLOOD CHLORIDE: CPT

## 2018-05-21 PROCEDURE — 82728 ASSAY OF FERRITIN: CPT

## 2018-05-21 PROCEDURE — 80307 DRUG TEST PRSMV CHEM ANLYZR: CPT

## 2018-05-21 PROCEDURE — 83880 ASSAY OF NATRIURETIC PEPTIDE: CPT

## 2018-05-21 PROCEDURE — 83010 ASSAY OF HAPTOGLOBIN QUANT: CPT

## 2018-05-21 PROCEDURE — 96374 THER/PROPH/DIAG INJ IV PUSH: CPT | Mod: XU

## 2018-05-21 PROCEDURE — 84300 ASSAY OF URINE SODIUM: CPT

## 2018-05-21 PROCEDURE — 82330 ASSAY OF CALCIUM: CPT

## 2018-05-21 PROCEDURE — 82607 VITAMIN B-12: CPT

## 2018-05-21 PROCEDURE — 87798 DETECT AGENT NOS DNA AMP: CPT

## 2018-05-21 PROCEDURE — 84145 PROCALCITONIN (PCT): CPT

## 2018-05-21 PROCEDURE — 71045 X-RAY EXAM CHEST 1 VIEW: CPT

## 2018-05-21 PROCEDURE — 97530 THERAPEUTIC ACTIVITIES: CPT

## 2018-05-21 PROCEDURE — 83735 ASSAY OF MAGNESIUM: CPT

## 2018-05-21 PROCEDURE — 99285 EMERGENCY DEPT VISIT HI MDM: CPT | Mod: 25

## 2018-05-21 PROCEDURE — 87486 CHLMYD PNEUM DNA AMP PROBE: CPT

## 2018-05-21 PROCEDURE — 93005 ELECTROCARDIOGRAM TRACING: CPT

## 2018-05-21 PROCEDURE — 86780 TREPONEMA PALLIDUM: CPT

## 2018-05-21 PROCEDURE — 85610 PROTHROMBIN TIME: CPT

## 2018-05-21 PROCEDURE — 93970 EXTREMITY STUDY: CPT

## 2018-05-21 PROCEDURE — 84132 ASSAY OF SERUM POTASSIUM: CPT

## 2018-05-21 PROCEDURE — 99239 HOSP IP/OBS DSCHRG MGMT >30: CPT

## 2018-05-21 PROCEDURE — 83550 IRON BINDING TEST: CPT

## 2018-05-21 PROCEDURE — 70450 CT HEAD/BRAIN W/O DYE: CPT

## 2018-05-21 PROCEDURE — 84295 ASSAY OF SERUM SODIUM: CPT

## 2018-05-21 RX ORDER — FUROSEMIDE 40 MG
1 TABLET ORAL
Qty: 0 | Refills: 0 | COMMUNITY
Start: 2018-05-21

## 2018-05-21 RX ORDER — APIXABAN 2.5 MG/1
1 TABLET, FILM COATED ORAL
Qty: 0 | Refills: 0 | COMMUNITY
Start: 2018-05-21

## 2018-05-21 RX ORDER — FERROUS SULFATE 325(65) MG
1 TABLET ORAL
Qty: 30 | Refills: 0 | OUTPATIENT
Start: 2018-05-21 | End: 2018-06-19

## 2018-05-21 RX ORDER — ASPIRIN/CALCIUM CARB/MAGNESIUM 324 MG
1 TABLET ORAL
Qty: 0 | Refills: 0 | COMMUNITY
Start: 2018-05-21

## 2018-05-21 RX ORDER — FUROSEMIDE 40 MG
1 TABLET ORAL
Qty: 0 | Refills: 0 | COMMUNITY

## 2018-05-21 RX ORDER — ASPIRIN/CALCIUM CARB/MAGNESIUM 324 MG
1 TABLET ORAL
Qty: 0 | Refills: 0 | COMMUNITY

## 2018-05-21 RX ADMIN — Medication 100 MILLIGRAM(S): at 05:03

## 2018-05-21 RX ADMIN — Medication 81 MILLIGRAM(S): at 11:13

## 2018-05-21 RX ADMIN — APIXABAN 5 MILLIGRAM(S): 2.5 TABLET, FILM COATED ORAL at 11:13

## 2018-05-21 RX ADMIN — SERTRALINE 200 MILLIGRAM(S): 25 TABLET, FILM COATED ORAL at 11:13

## 2018-05-21 RX ADMIN — Medication 100 MILLIGRAM(S): at 11:14

## 2018-05-21 RX ADMIN — Medication 20 MILLIGRAM(S): at 05:03

## 2018-05-21 RX ADMIN — Medication 325 MILLIGRAM(S): at 11:13

## 2018-05-21 RX ADMIN — CARVEDILOL PHOSPHATE 3.12 MILLIGRAM(S): 80 CAPSULE, EXTENDED RELEASE ORAL at 11:13

## 2018-05-21 NOTE — PROGRESS NOTE ADULT - ASSESSMENT
1.  Pulmonary Embolism  2.  Moderate AS  3.  Reduced Ejection Fraction  4.  Atheroslerosis      Plan:  1  Continue with lasix to 20mg every other day  2  Continue with Eliquis 5mg BID    3  Continue to monitor CBC  4.  For CAD, continue statin, betablocker and antiplatelet therapy.  HR well controlled.   5.  D/C planning      Will follow with you    Dionte Lynch  [phone]

## 2018-05-21 NOTE — PROGRESS NOTE ADULT - PROBLEM SELECTOR PLAN 3
- Pt with HARPREET, likely from contrast induced nephropathy from CTA, now improved  -Monitor BMP daily. - Some pulm edema seen on xray, now s/p 40 mg IV lasix. Likely in setting of holding home lasix dose  - continue lasix 20mg PO ever other day per cards

## 2018-05-21 NOTE — PROGRESS NOTE ADULT - PROBLEM SELECTOR PLAN 5
- Transitioned to Eliquis.  - pain resolved.  - removed NC, will monitor sats off. - Transitioned to Eliquis.  - pain resolved.  - titrate O2 NC as tolerated - Pt with history of depression, now on sertraline. Pt very afraid of dying, despite reassurance does get anxious.    - Will need further eval prior to discharge per psych  - c/w sertraline - Pt with history of depression, now on sertraline. has baseline anxiety  - c/w sertraline  -outpatient psych follow up

## 2018-05-21 NOTE — PROGRESS NOTE ADULT - PROBLEM SELECTOR PROBLEM 8
Need for prophylactic measure
Thrombocytopenia
Thrombocytopenia
Need for prophylactic measure

## 2018-05-21 NOTE — PROGRESS NOTE ADULT - PROBLEM SELECTOR PLAN 4
- Pt with history of depression, now on sertraline. Pt very afraid of dying, despite reassurance does get anxious.    - Will need further eval prior to discharge per psych  - c/w sertraline - Pt with HARPREET, likely from contrast induced nephropathy from CTA, now improved  -Monitor BMP daily.

## 2018-05-21 NOTE — PROGRESS NOTE ADULT - ASSESSMENT
89 Y/O/ F with PMH of HFrEF (EF ~45%), CAD/MI (2005, s/p PCI), Severe AS, h/o DVT (2014, no longer on Coumadin), s/p PPM ([employer]), and HLD p/w CP/SOB found to have CT evidence of submassive R-sided PE w/ elevated BNP, now on hep gtt improving. Currently in no pain; pt now with HARPREET resolving like SARAH; pt now with drop in Hb, no source noted, skin exam today also showing no source.  Hb now improving with transition to Eliquis, plan for dc to rehab in next few days. 89 Y/O/ F with PMH of HFrEF (EF ~45%), CAD/MI (2005, s/p PCI), Severe AS, h/o DVT (2014, no longer on Coumadin), s/p PPM ([employer]), and HLD p/w CP/SOB found to have CT evidence of submassive R-sided PE w/ elevated BNP, now on hep gtt improving. Currently in no pain; pt now with HARPREET resolving like SARAH; pt now with drop in Hb, no source noted, skin exam today also showing no source.  Hb now improving with transition to Eliquis, plan for dc to rehab.

## 2018-05-21 NOTE — PROGRESS NOTE ADULT - PROBLEM SELECTOR PLAN 6
- ASA 81mg daily  - c/w coreg with hold parameters.  -C/w atorvastatin. - Transitioned to Eliquis.  - pain resolved.  - titrate O2 NC as tolerated

## 2018-05-21 NOTE — PROGRESS NOTE ADULT - PROBLEM SELECTOR PLAN 7
-DVT PPX: on Eliquis  -DISPO: Rehab  -CODE STATUS: Full Code - ASA 81mg daily  - c/w coreg with hold parameters.  -C/w atorvastatin.

## 2018-05-21 NOTE — PROGRESS NOTE ADULT - SUBJECTIVE AND OBJECTIVE BOX
Internal Medicine Progress Note    Megan Rajput, PGY1  Internal Medicine, team 1  Pager [phone] / [phone]    Patient is a 89y old  Female who presents with a chief complaint of Pulmonary Embolism (11 May 2018 18:51)      SUBJECTIVE / OVERNIGHT EVENTS: CHATO overnight.     MEDICATIONS  (STANDING):  apixaban 5 milliGRAM(s) Oral every 12 hours  aspirin enteric coated 81 milliGRAM(s) Oral daily  atorvastatin 80 milliGRAM(s) Oral at bedtime  carvedilol 3.125 milliGRAM(s) Oral every 12 hours  docusate sodium 100 milliGRAM(s) Oral three times a day  ferrous    sulfate 325 milliGRAM(s) Oral daily  furosemide    Tablet 20 milliGRAM(s) Oral <User Schedule>  senna 2 Tablet(s) Oral at bedtime  sertraline 200 milliGRAM(s) Oral daily    MEDICATIONS  (PRN):  acetaminophen   Tablet. 650 milliGRAM(s) Oral every 6 hours PRN Mild and Moderate Pain (1 - 6)  LORazepam   Injectable 0.25 milliGRAM(s) IV Push every 6 hours PRN severe agitation  melatonin 3 milliGRAM(s) Oral at bedtime PRN Insomnia  polyethylene glycol 3350 17 Gram(s) Oral daily PRN Constipation  valproate sodium IVPB 125 milliGRAM(s) IV Intermittent every 8 hours PRN Agitation    Vital Signs Last 24 Hrs  T(C): 36.7 (21 May 2018 04:59), Max: 36.7 (21 May 2018 04:59)  T(F): 98 (21 May 2018 04:59), Max: 98 (21 May 2018 04:59)  HR: 61 (21 May 2018 04:59) (60 - 70)  BP: 107/61 (21 May 2018 04:59) (75/40 - 107/61)  BP(mean): --  RR: 22 (21 May 2018 04:59) (16 - 22)  SpO2: 98% (21 May 2018 04:59) (89% - 98%)    CAPILLARY BLOOD GLUCOSE        I&O's Summary    19 May 2018 07:01  -  20 May 2018 07:00  --------------------------------------------------------  IN: 540 mL / OUT: 200 mL / NET: 340 mL    20 May 2018 07:01  -  21 May 2018 06:53  --------------------------------------------------------  IN: 480 mL / OUT: 0 mL / NET: 480 mL        PHYSICAL EXAM  GENERAL: NAD, well-developed  HEAD:  Atraumatic, Normocephalic  EYES: EOMI, PERRLA, conjunctiva and sclera clear  NECK: Supple, No JVD  CHEST/LUNG: Clear to auscultation bilaterally; No wheeze  HEART: Regular rate and rhythm; No murmurs, rubs, or gallops  ABDOMEN: Soft, Nontender, Nondistended; Bowel sounds present  EXTREMITIES:  2+ Peripheral Pulses, No clubbing, cyanosis, or edema  NEURO/PSYCH: AAOx3, nonfocal  SKIN: No rashes or lesions      LABS:                        8.7    9.88  )-----------( 186      ( 20 May 2018 07:47 )             26.9     CBC Full  -  ( 20 May 2018 07:47 )  WBC Count : 9.88 K/uL  Hemoglobin : 8.7 g/dL  Hematocrit : 26.9 %  Platelet Count - Automated : 186 K/uL  Mean Cell Volume : 95.4 fl  Mean Cell Hemoglobin : 30.9 pg  Mean Cell Hemoglobin Concentration : 32.3 gm/dL  Auto Neutrophil # : x  Auto Lymphocyte # : x  Auto Monocyte # : x  Auto Eosinophil # : x  Auto Basophil # : x  Auto Neutrophil % : x  Auto Lymphocyte % : x  Auto Monocyte % : x  Auto Eosinophil % : x  Auto Basophil % : x    05-20    145  |  101  |  41<H>  ----------------------------<  128<H>  3.7   |  34<H>  |  1.11    Ca    8.7      20 May 2018 06:32  Phos  3.7     05-20  Mg     2.2     05-20      Creatinine Trend: 1.11<--, 1.14<--, 1.11<--, 1.13<--, 1.23<--, 1.42<--    PTT - ( 19 May 2018 08:46 )  PTT:31.9 sec            MICROBIOLOGY:      RADIOLOGY & ADDITIONAL TESTS:     CONSULTS: Internal Medicine Progress Note    Megan Rajput, PGY1  Internal Medicine, team 1  Pager [phone] / [phone]  After 7PM on weekdays and 12PM on weekends please page #[phone]    Patient is a 89y old  Female who presents with a chief complaint of Pulmonary Embolism (11 May 2018 18:51)      SUBJECTIVE / OVERNIGHT EVENTS: CHATO overnight.     MEDICATIONS  (STANDING):  apixaban 5 milliGRAM(s) Oral every 12 hours  aspirin enteric coated 81 milliGRAM(s) Oral daily  atorvastatin 80 milliGRAM(s) Oral at bedtime  carvedilol 3.125 milliGRAM(s) Oral every 12 hours  docusate sodium 100 milliGRAM(s) Oral three times a day  ferrous    sulfate 325 milliGRAM(s) Oral daily  furosemide    Tablet 20 milliGRAM(s) Oral <User Schedule>  senna 2 Tablet(s) Oral at bedtime  sertraline 200 milliGRAM(s) Oral daily    MEDICATIONS  (PRN):  acetaminophen   Tablet. 650 milliGRAM(s) Oral every 6 hours PRN Mild and Moderate Pain (1 - 6)  LORazepam   Injectable 0.25 milliGRAM(s) IV Push every 6 hours PRN severe agitation  melatonin 3 milliGRAM(s) Oral at bedtime PRN Insomnia  polyethylene glycol 3350 17 Gram(s) Oral daily PRN Constipation  valproate sodium IVPB 125 milliGRAM(s) IV Intermittent every 8 hours PRN Agitation    Vital Signs Last 24 Hrs  T(C): 36.7 (21 May 2018 04:59), Max: 36.7 (21 May 2018 04:59)  T(F): 98 (21 May 2018 04:59), Max: 98 (21 May 2018 04:59)  HR: 61 (21 May 2018 04:59) (60 - 70)  BP: 107/61 (21 May 2018 04:59) (75/40 - 107/61)  BP(mean): --  RR: 22 (21 May 2018 04:59) (16 - 22)  SpO2: 98% (21 May 2018 04:59) (89% - 98%)    CAPILLARY BLOOD GLUCOSE        I&O's Summary    19 May 2018 07:01  -  20 May 2018 07:00  --------------------------------------------------------  IN: 540 mL / OUT: 200 mL / NET: 340 mL    20 May 2018 07:01  -  21 May 2018 06:53  --------------------------------------------------------  IN: 480 mL / OUT: 0 mL / NET: 480 mL        PHYSICAL EXAM  GENERAL: NAD, well-developed  HEAD:  Atraumatic, Normocephalic  EYES: EOMI, PERRLA, conjunctiva and sclera clear  NECK: Supple, No JVD  CHEST/LUNG: Clear to auscultation bilaterally; No wheeze  HEART: Regular rate and rhythm; No murmurs, rubs, or gallops  ABDOMEN: Soft, Nontender, Nondistended; Bowel sounds present  EXTREMITIES:  2+ Peripheral Pulses, No clubbing, cyanosis, or edema  NEURO/PSYCH: AAOx3, nonfocal  SKIN: No rashes or lesions      LABS:                        8.7    9.88  )-----------( 186      ( 20 May 2018 07:47 )             26.9     CBC Full  -  ( 20 May 2018 07:47 )  WBC Count : 9.88 K/uL  Hemoglobin : 8.7 g/dL  Hematocrit : 26.9 %  Platelet Count - Automated : 186 K/uL  Mean Cell Volume : 95.4 fl  Mean Cell Hemoglobin : 30.9 pg  Mean Cell Hemoglobin Concentration : 32.3 gm/dL  Auto Neutrophil # : x  Auto Lymphocyte # : x  Auto Monocyte # : x  Auto Eosinophil # : x  Auto Basophil # : x  Auto Neutrophil % : x  Auto Lymphocyte % : x  Auto Monocyte % : x  Auto Eosinophil % : x  Auto Basophil % : x    05-20    145  |  101  |  41<H>  ----------------------------<  128<H>  3.7   |  34<H>  |  1.11    Ca    8.7      20 May 2018 06:32  Phos  3.7     05-20  Mg     2.2     05-20      Creatinine Trend: 1.11<--, 1.14<--, 1.11<--, 1.13<--, 1.23<--, 1.42<--    PTT - ( 19 May 2018 08:46 )  PTT:31.9 sec            MICROBIOLOGY:      RADIOLOGY & ADDITIONAL TESTS:     CONSULTS: Internal Medicine Progress Note    Megan Rajput, PGY1  Internal Medicine, team 1  Pager [phone] / [phone]  After 7PM on weekdays and 12PM on weekends please page #[phone]    Patient is a 89y old  Female who presents with a chief complaint of Pulmonary Embolism (11 May 2018 18:51)      SUBJECTIVE / OVERNIGHT EVENTS: CHATO overnight. On tele, SR 1st degree block, AV paced 60s-70s. Said she feels terrible and is sitting in the chair too long, but unable to say why she feels terrible. Denies pain, SOB, CP, abd pain.    MEDICATIONS  (STANDING):  apixaban 5 milliGRAM(s) Oral every 12 hours  aspirin enteric coated 81 milliGRAM(s) Oral daily  atorvastatin 80 milliGRAM(s) Oral at bedtime  carvedilol 3.125 milliGRAM(s) Oral every 12 hours  docusate sodium 100 milliGRAM(s) Oral three times a day  ferrous    sulfate 325 milliGRAM(s) Oral daily  furosemide    Tablet 20 milliGRAM(s) Oral <User Schedule>  senna 2 Tablet(s) Oral at bedtime  sertraline 200 milliGRAM(s) Oral daily    MEDICATIONS  (PRN):  acetaminophen   Tablet. 650 milliGRAM(s) Oral every 6 hours PRN Mild and Moderate Pain (1 - 6)  LORazepam   Injectable 0.25 milliGRAM(s) IV Push every 6 hours PRN severe agitation  melatonin 3 milliGRAM(s) Oral at bedtime PRN Insomnia  polyethylene glycol 3350 17 Gram(s) Oral daily PRN Constipation  valproate sodium IVPB 125 milliGRAM(s) IV Intermittent every 8 hours PRN Agitation    Vital Signs Last 24 Hrs  T(C): 36.7 (21 May 2018 04:59), Max: 36.7 (21 May 2018 04:59)  T(F): 98 (21 May 2018 04:59), Max: 98 (21 May 2018 04:59)  HR: 61 (21 May 2018 04:59) (60 - 70)  BP: 107/61 (21 May 2018 04:59) (75/40 - 107/61)  BP(mean): --  RR: 22 (21 May 2018 04:59) (16 - 22)  SpO2: 98% (21 May 2018 04:59) (89% - 98%)    CAPILLARY BLOOD GLUCOSE        I&O's Summary    19 May 2018 07:01  -  20 May 2018 07:00  --------------------------------------------------------  IN: 540 mL / OUT: 200 mL / NET: 340 mL    20 May 2018 07:01  -  21 May 2018 06:53  --------------------------------------------------------  IN: 480 mL / OUT: 0 mL / NET: 480 mL        PHYSICAL EXAM  GENERAL: NAD, well-developed  HEAD:  Atraumatic, Normocephalic  EYES: EOMI, PERRLA, conjunctiva and sclera clear  NECK: Supple, No JVD  CHEST/LUNG: Clear to auscultation bilaterally; No wheeze  HEART: Regular rate and rhythm; systolic murmur loudest RUSB with no radiation to carotids. No rubs or gallops  ABDOMEN: Soft, Nontender, Nondistended; Bowel sounds present  EXTREMITIES:  2+ Peripheral Pulses, No clubbing, cyanosis, or edema  NEURO/PSYCH: AAOx3, nonfocal  SKIN: No rashes or lesions      LABS:                        8.7    9.88  )-----------( 186      ( 20 May 2018 07:47 )             26.9     CBC Full  -  ( 20 May 2018 07:47 )  WBC Count : 9.88 K/uL  Hemoglobin : 8.7 g/dL  Hematocrit : 26.9 %  Platelet Count - Automated : 186 K/uL  Mean Cell Volume : 95.4 fl  Mean Cell Hemoglobin : 30.9 pg  Mean Cell Hemoglobin Concentration : 32.3 gm/dL  Auto Neutrophil # : x  Auto Lymphocyte # : x  Auto Monocyte # : x  Auto Eosinophil # : x  Auto Basophil # : x  Auto Neutrophil % : x  Auto Lymphocyte % : x  Auto Monocyte % : x  Auto Eosinophil % : x  Auto Basophil % : x    05-20    145  |  101  |  41<H>  ----------------------------<  128<H>  3.7   |  34<H>  |  1.11    Ca    8.7      20 May 2018 06:32  Phos  3.7     05-20  Mg     2.2     05-20      Creatinine Trend: 1.11<--, 1.14<--, 1.11<--, 1.13<--, 1.23<--, 1.42<--    PTT - ( 19 May 2018 08:46 )  PTT:31.9 sec            MICROBIOLOGY:      RADIOLOGY & ADDITIONAL TESTS:     CONSULTS: Internal Medicine Progress Note    Megan Rajput, PGY1  Internal Medicine, team 1  Pager [phone] / [phone]  After 7PM on weekdays and 12PM on weekends please page #[phone]    Patient is a 89y old  Female who presents with a chief complaint of Pulmonary Embolism (11 May 2018 18:51)      SUBJECTIVE / OVERNIGHT EVENTS: CHATO overnight. On tele, SR 1st degree block, AV paced 60s-70s. Said she feels terrible and is sitting in the chair too long, but unable to say why she feels terrible. Denies pain, SOB, CP, abd pain.    MEDICATIONS  (STANDING):  apixaban 5 milliGRAM(s) Oral every 12 hours  aspirin enteric coated 81 milliGRAM(s) Oral daily  atorvastatin 80 milliGRAM(s) Oral at bedtime  carvedilol 3.125 milliGRAM(s) Oral every 12 hours  docusate sodium 100 milliGRAM(s) Oral three times a day  ferrous    sulfate 325 milliGRAM(s) Oral daily  furosemide    Tablet 20 milliGRAM(s) Oral <User Schedule>  senna 2 Tablet(s) Oral at bedtime  sertraline 200 milliGRAM(s) Oral daily    MEDICATIONS  (PRN):  acetaminophen   Tablet. 650 milliGRAM(s) Oral every 6 hours PRN Mild and Moderate Pain (1 - 6)  LORazepam   Injectable 0.25 milliGRAM(s) IV Push every 6 hours PRN severe agitation  melatonin 3 milliGRAM(s) Oral at bedtime PRN Insomnia  polyethylene glycol 3350 17 Gram(s) Oral daily PRN Constipation  valproate sodium IVPB 125 milliGRAM(s) IV Intermittent every 8 hours PRN Agitation    Vital Signs Last 24 Hrs  T(C): 36.7 (21 May 2018 04:59), Max: 36.7 (21 May 2018 04:59)  T(F): 98 (21 May 2018 04:59), Max: 98 (21 May 2018 04:59)  HR: 61 (21 May 2018 04:59) (60 - 70)  BP: 107/61 (21 May 2018 04:59) (75/40 - 107/61)  BP(mean): --  RR: 22 (21 May 2018 04:59) (16 - 22)  SpO2: 98% (21 May 2018 04:59) (89% - 98%)    CAPILLARY BLOOD GLUCOSE        I&O's Summary    19 May 2018 07:01  -  20 May 2018 07:00  --------------------------------------------------------  IN: 540 mL / OUT: 200 mL / NET: 340 mL    20 May 2018 07:01  -  21 May 2018 06:53  --------------------------------------------------------  IN: 480 mL / OUT: 0 mL / NET: 480 mL        PHYSICAL EXAM  GENERAL: NAD, well-developed  HEAD:  Atraumatic, Normocephalic  EYES: EOMI, PERRLA, conjunctiva and sclera clear  NECK: Supple, No JVD  CHEST/LUNG: Clear to auscultation bilaterally; No wheeze  HEART: Regular rate and rhythm; systolic murmur loudest RUSB with no radiation to carotids. No rubs or gallops  ABDOMEN: Soft, Nontender, Nondistended; Bowel sounds present  EXTREMITIES:  2+ Peripheral Pulses, No clubbing, cyanosis, or edema  NEURO/PSYCH: AAOx3, nonfocal  SKIN: No rashes or lesions      LABS:                        8.3    9.01  )-----------( 187      ( 21 May 2018 07:52 )             26.2     CBC Full  -  ( 21 May 2018 07:52 )  WBC Count : 9.01 K/uL  Hemoglobin : 8.3 g/dL  Hematocrit : 26.2 %  Platelet Count - Automated : 187 K/uL  Mean Cell Volume : 96.0 fl  Mean Cell Hemoglobin : 30.4 pg  Mean Cell Hemoglobin Concentration : 31.7 gm/dL  Auto Neutrophil # : x  Auto Lymphocyte # : x  Auto Monocyte # : x  Auto Eosinophil # : x  Auto Basophil # : x  Auto Neutrophil % : x  Auto Lymphocyte % : x  Auto Monocyte % : x  Auto Eosinophil % : x  Auto Basophil % : x    05-21    143  |  100  |  39<H>  ----------------------------<  122<H>  3.7   |  35<H>  |  1.16    Ca    8.6      21 May 2018 06:38  Phos  3.0     05-21  Mg     2.2     05-21      Creatinine Trend: 1.16<--, 1.11<--, 1.14<--, 1.11<--, 1.13<--, 1.23<--        MICROBIOLOGY:        RADIOLOGY & ADDITIONAL TESTS:     CONSULTS: Internal Medicine Progress Note    Megan Rajput, PGY1  Internal Medicine, team 1  Pager [phone] / [phone]  After 7PM on weekdays and 12PM on weekends please page #[phone]    Patient is a 89y old  Female who presents with a chief complaint of Pulmonary Embolism (11 May 2018 18:51)      SUBJECTIVE / OVERNIGHT EVENTS: CHATO overnight. On tele, SR 1st degree block, AV paced 60s-70s. Said she feels terrible and is sitting in the chair too long, but unable to say why she feels terrible. Denies pain, SOB, CP, abd pain.    MEDICATIONS  (STANDING):  apixaban 5 milliGRAM(s) Oral every 12 hours  aspirin enteric coated 81 milliGRAM(s) Oral daily  atorvastatin 80 milliGRAM(s) Oral at bedtime  carvedilol 3.125 milliGRAM(s) Oral every 12 hours  docusate sodium 100 milliGRAM(s) Oral three times a day  ferrous    sulfate 325 milliGRAM(s) Oral daily  furosemide    Tablet 20 milliGRAM(s) Oral <User Schedule>  senna 2 Tablet(s) Oral at bedtime  sertraline 200 milliGRAM(s) Oral daily    MEDICATIONS  (PRN):  acetaminophen   Tablet. 650 milliGRAM(s) Oral every 6 hours PRN Mild and Moderate Pain (1 - 6)  LORazepam   Injectable 0.25 milliGRAM(s) IV Push every 6 hours PRN severe agitation  melatonin 3 milliGRAM(s) Oral at bedtime PRN Insomnia  polyethylene glycol 3350 17 Gram(s) Oral daily PRN Constipation  valproate sodium IVPB 125 milliGRAM(s) IV Intermittent every 8 hours PRN Agitation    Vital Signs Last 24 Hrs  T(C): 36.7 (21 May 2018 04:59), Max: 36.7 (21 May 2018 04:59)  T(F): 98 (21 May 2018 04:59), Max: 98 (21 May 2018 04:59)  HR: 61 (21 May 2018 04:59) (60 - 70)  BP: 107/61 (21 May 2018 04:59) (75/40 - 107/61)  BP(mean): --  RR: 22 (21 May 2018 04:59) (16 - 22)  SpO2: 98% (21 May 2018 04:59) (89% - 98%)    CAPILLARY BLOOD GLUCOSE        I&O's Summary    19 May 2018 07:01  -  20 May 2018 07:00  --------------------------------------------------------  IN: 540 mL / OUT: 200 mL / NET: 340 mL    20 May 2018 07:01  -  21 May 2018 06:53  --------------------------------------------------------  IN: 480 mL / OUT: 0 mL / NET: 480 mL        PHYSICAL EXAM  GENERAL: NAD, well-developed  HEAD:  Atraumatic, Normocephalic  EYES: EOMI, PERRLA, conjunctiva and sclera clear  NECK: Supple, No JVD  CHEST/LUNG: Clear to auscultation bilaterally; No wheeze  HEART: Regular rate and rhythm; systolic murmur loudest RUSB with no radiation to carotids. No rubs or gallops  ABDOMEN: Soft, Nontender, Nondistended; Bowel sounds present  EXTREMITIES:  2+ Peripheral Pulses, No clubbing, cyanosis, or edema  NEURO/PSYCH: AAOx3, nonfocal  SKIN: No rashes or lesions      LABS:                        8.3    9.01  )-----------( 187      ( 21 May 2018 07:52 )             26.2     CBC Full  -  ( 21 May 2018 07:52 )  WBC Count : 9.01 K/uL  Hemoglobin : 8.3 g/dL  Hematocrit : 26.2 %  Platelet Count - Automated : 187 K/uL  Mean Cell Volume : 96.0 fl  Mean Cell Hemoglobin : 30.4 pg  Mean Cell Hemoglobin Concentration : 31.7 gm/dL  Auto Neutrophil # : x  Auto Lymphocyte # : x  Auto Monocyte # : x  Auto Eosinophil # : x  Auto Basophil # : x  Auto Neutrophil % : x  Auto Lymphocyte % : x  Auto Monocyte % : x  Auto Eosinophil % : x  Auto Basophil % : x    05-21    143  |  100  |  39<H>  ----------------------------<  122<H>  3.7   |  35<H>  |  1.16    Ca    8.6      21 May 2018 06:38  Phos  3.0     05-21  Mg     2.2     05-21      Creatinine Trend: 1.16<--, 1.11<--, 1.14<--, 1.11<--, 1.13<--, 1.23<--        MICROBIOLOGY:        RADIOLOGY & ADDITIONAL TESTS:     CONSULTS: cardiology

## 2018-05-21 NOTE — PROGRESS NOTE ADULT - PROBLEM SELECTOR PLAN 1
- Pt with acute drop in Hb to 8.1 then was 8.4 yesterday, 8.7 today.  Pt continuing to have intermittent epistaxis  - No sources found at this point  - Removed NC in hopes to stop epistaxis.  - Eliquis started, will monitor.  -Iron studies show iron deficiency anemia. C/w ferrous sulfate 325mg PO daily.  -Bowel regimen while on iron supplements. Presented with SOB and CP, found to have right PE without evidence of RV strain. Hx of DVT  -continue apixaban 5mg q12h

## 2018-05-21 NOTE — PROGRESS NOTE ADULT - SUBJECTIVE AND OBJECTIVE BOX
PAGER:  [phone]               [facility] [id number]              EMAIL [email or web address]   OFFICE [phone]                              ********VASCULAR MEDICINE & CARDIOLOGY PROGRESS NOTE********                        CC:  PE    INTERVAL HISTORY:  No events. Remains on  eliquis.  Hemoglobin remains stable.  No CP or SOB>   MEDICATIONS  (STANDING):  apixaban 5 milliGRAM(s) Oral every 12 hours  aspirin enteric coated 81 milliGRAM(s) Oral daily  atorvastatin 80 milliGRAM(s) Oral at bedtime  carvedilol 3.125 milliGRAM(s) Oral every 12 hours  docusate sodium 100 milliGRAM(s) Oral three times a day  ferrous    sulfate 325 milliGRAM(s) Oral daily  furosemide    Tablet 20 milliGRAM(s) Oral <User Schedule>  senna 2 Tablet(s) Oral at bedtime  sertraline 200 milliGRAM(s) Oral daily    MEDICATIONS  (PRN):  acetaminophen   Tablet. 650 milliGRAM(s) Oral every 6 hours PRN Mild and Moderate Pain (1 - 6)  LORazepam   Injectable 0.25 milliGRAM(s) IV Push every 6 hours PRN severe agitation  melatonin 3 milliGRAM(s) Oral at bedtime PRN Insomnia  polyethylene glycol 3350 17 Gram(s) Oral daily PRN Constipation  valproate sodium IVPB 125 milliGRAM(s) IV Intermittent every 8 hours PRN Agitation  PAST MEDICAL & SURGICAL HISTORY:  Depression  High cholesterol  DVT (deep venous thrombosis)  Myocardial infarction: 2005  CAD (coronary artery disease)  S/P hemorrhoidectomy      FAMILY HISTORY:  No pertinent family history in first degree relatives      SOCIAL HISTORY:  unchanged    REVIEW OF SYSTEMS:  CONSTITUTIONAL: No fever, weight loss, or fatigue  EYES: No eye pain, visual disturbances, or discharge  ENMT:  No difficulty hearing, tinnitus, vertigo; No sinus or throat pain  NECK: No pain or stiffness  RESPIRATORY: No cough, wheezing, chills or hemoptysis; No Shortness of Breath  CARDIOVASCULAR: No chest pain, palpitations, passing out, dizziness, or leg swelling  GASTROINTESTINAL: No abdominal or epigastric pain. No nausea, vomiting, or hematemesis; No diarrhea or constipation. No melena or hematochezia.  GENITOURINARY: No dysuria, frequency, hematuria, or incontinence  NEUROLOGICAL: No headaches, memory loss, loss of strength, numbness, or tremors  SKIN: No itching, burning, rashes, or lesions   LYMPH Nodes: No enlarged glands  ENDOCRINE: No heat or cold intolerance; No hair loss  MUSCULOSKELETAL: No joint pain or swelling; No muscle, back, or extremity pain  PSYCHIATRIC: No depression, anxiety, mood swings, or difficulty sleeping  HEME/LYMPH: No easy bruising, or bleeding gums  ALLERY AND IMMUNOLOGIC: No hives or eczema	    [x ] All others negative	  [ ] Unable to obtain    ICU Vital Signs Last 24 Hrs  T(C): 36.6 (21 May 2018 12:57), Max: 36.7 (21 May 2018 04:59)  T(F): 97.8 (21 May 2018 12:57), Max: 98 (21 May 2018 04:59)  HR: 59 (21 May 2018 12:57) (59 - 70)  BP: 91/61 (21 May 2018 12:57) (75/40 - 107/61)  BP(mean): --  ABP: --  ABP(mean): --  RR: 17 (21 May 2018 12:57) (16 - 22)  SpO2: 100% (21 May 2018 12:57) (89% - 100%)        Appearance: Normal	  HEENT:   Normal oral mucosa, PERRL, EOMI	  Lymphatic: No lymphadenopathy  Cardiovascular: Normal S1 S2, No JVD, No murmurs, No edema  Respiratory: Lungs clear to auscultation	  Psychiatry: A & O x 3, Mood & affect appropriate  Gastrointestinal:  Soft, Non-tender, + BS	  Skin: No rashes, No ecchymoses, No cyanosis	  Neurologic: Non-focal  Extremities: Normal range of motion, No clubbing, cyanosis or edema  Vascular: Peripheral pulses palpable 2+ bilaterally                                           8.3    9.01  )-----------( 187      ( 21 May 2018 07:52 )             26.2   05-21    143  |  100  |  39<H>  ----------------------------<  122<H>  3.7   |  35<H>  |  1.16    Ca    8.6      21 May 2018 06:38  Phos  3.0     05-21  Mg     2.2     05-21

## 2018-05-21 NOTE — PROGRESS NOTE ADULT - PROBLEM SELECTOR PLAN 2
- Some pulm edema seen on xray, now s/p 40 mg IV lasix. Likely in setting of holding home lasix dose  - 20 mg lasix PO changed to QOD per cardio recs. - Some pulm edema seen on xray, now s/p 40 mg IV lasix. Likely in setting of holding home lasix dose  - continue lasix 20mg PO ever other day per cards Hg stable around 8.5. Previous blood loss from epistaxis now resolved.  -No sign of bleeding  -Iron studies show iron deficiency anemia. C/w ferrous sulfate 325mg PO daily.  -Bowel regimen while on iron supplements.

## 2018-06-04 ENCOUNTER — INPATIENT (INPATIENT)
Facility: HOSPITAL | Age: 83
LOS: 2 days | DRG: 871 | End: 2018-06-07
Attending: INTERNAL MEDICINE | Admitting: INTERNAL MEDICINE
Payer: MEDICARE

## 2018-06-04 DIAGNOSIS — R10.9 UNSPECIFIED ABDOMINAL PAIN: ICD-10-CM

## 2018-06-04 PROCEDURE — 93010 ELECTROCARDIOGRAM REPORT: CPT

## 2018-06-04 PROCEDURE — 71045 X-RAY EXAM CHEST 1 VIEW: CPT | Mod: 26

## 2018-06-04 PROCEDURE — 74176 CT ABD & PELVIS W/O CONTRAST: CPT | Mod: 26

## 2018-06-05 PROCEDURE — 76700 US EXAM ABDOM COMPLETE: CPT | Mod: 26

## 2018-06-05 PROCEDURE — 93010 ELECTROCARDIOGRAM REPORT: CPT

## 2018-06-05 PROCEDURE — 93306 TTE W/DOPPLER COMPLETE: CPT | Mod: 26

## 2018-06-05 PROCEDURE — 71045 X-RAY EXAM CHEST 1 VIEW: CPT | Mod: 26

## 2018-06-07 PROCEDURE — 86900 BLOOD TYPING SEROLOGIC ABO: CPT

## 2018-06-07 PROCEDURE — 85027 COMPLETE CBC AUTOMATED: CPT

## 2018-06-07 PROCEDURE — 84484 ASSAY OF TROPONIN QUANT: CPT

## 2018-06-07 PROCEDURE — 80053 COMPREHEN METABOLIC PANEL: CPT

## 2018-06-07 PROCEDURE — 82728 ASSAY OF FERRITIN: CPT

## 2018-06-07 PROCEDURE — 83550 IRON BINDING TEST: CPT

## 2018-06-07 PROCEDURE — 96361 HYDRATE IV INFUSION ADD-ON: CPT

## 2018-06-07 PROCEDURE — 87040 BLOOD CULTURE FOR BACTERIA: CPT

## 2018-06-07 PROCEDURE — 86850 RBC ANTIBODY SCREEN: CPT

## 2018-06-07 PROCEDURE — 80076 HEPATIC FUNCTION PANEL: CPT

## 2018-06-07 PROCEDURE — 93306 TTE W/DOPPLER COMPLETE: CPT

## 2018-06-07 PROCEDURE — 84550 ASSAY OF BLOOD/URIC ACID: CPT

## 2018-06-07 PROCEDURE — 85025 COMPLETE CBC W/AUTO DIFF WBC: CPT

## 2018-06-07 PROCEDURE — 85610 PROTHROMBIN TIME: CPT

## 2018-06-07 PROCEDURE — 74176 CT ABD & PELVIS W/O CONTRAST: CPT

## 2018-06-07 PROCEDURE — 85730 THROMBOPLASTIN TIME PARTIAL: CPT

## 2018-06-07 PROCEDURE — 83605 ASSAY OF LACTIC ACID: CPT

## 2018-06-07 PROCEDURE — 82746 ASSAY OF FOLIC ACID SERUM: CPT

## 2018-06-07 PROCEDURE — 71045 X-RAY EXAM CHEST 1 VIEW: CPT

## 2018-06-07 PROCEDURE — 99285 EMERGENCY DEPT VISIT HI MDM: CPT | Mod: 25

## 2018-06-07 PROCEDURE — 83735 ASSAY OF MAGNESIUM: CPT

## 2018-06-07 PROCEDURE — 82550 ASSAY OF CK (CPK): CPT

## 2018-06-07 PROCEDURE — 83880 ASSAY OF NATRIURETIC PEPTIDE: CPT

## 2018-06-07 PROCEDURE — 80048 BASIC METABOLIC PNL TOTAL CA: CPT

## 2018-06-07 PROCEDURE — 82607 VITAMIN B-12: CPT

## 2018-06-07 PROCEDURE — 96375 TX/PRO/DX INJ NEW DRUG ADDON: CPT

## 2018-06-07 PROCEDURE — 96374 THER/PROPH/DIAG INJ IV PUSH: CPT

## 2018-06-07 PROCEDURE — 82553 CREATINE MB FRACTION: CPT

## 2018-06-07 PROCEDURE — 82272 OCCULT BLD FECES 1-3 TESTS: CPT

## 2018-06-07 PROCEDURE — 76700 US EXAM ABDOM COMPLETE: CPT

## 2018-06-07 PROCEDURE — 93005 ELECTROCARDIOGRAM TRACING: CPT

## 2018-06-07 PROCEDURE — 84100 ASSAY OF PHOSPHORUS: CPT

## 2018-06-07 PROCEDURE — 80069 RENAL FUNCTION PANEL: CPT

## 2018-06-07 PROCEDURE — 83036 HEMOGLOBIN GLYCOSYLATED A1C: CPT

## 2018-10-10 NOTE — ED ADULT NURSE NOTE - NSSISCREENINGQ2_ED_A_ED
Called and left message to call back  [facility] Hospital Medicine  Discharge Summary      Patient Name: Santy Anderson  MRN: [id number]  Admission Date: 10/8/2018  Hospital Length of Stay: 0 days  Discharge Date 10/9/18  Attending Physician:Dr. Philip   Discharging Provider: Mojgan Champagne NP  Primary Care Provider: Joanie Simms MD      HPI:   Santy Anderson is a 70 year old male with lung cancer status post resection and alcoholism who presented to the ED with complaints of hallucinations that began 3 days prior to arrival. The patient reports seeing people that were not there on multiple occasions. He denies other symptoms including fever, chills, cough, hemoptysis, dizziness and headache. The patient reports being treated for knee pain with Norco which is started one day prior to the onset of hallucinations. He reports drinking three beers most days. His last drink was 3 days ago. He is followed by Dr. Lara for his history of lung cancer and reports that his only treatment was surgical. In the ED, the patient's head CT showed heterogeneous marrow signal and multiple lytic abnormalities of unknown clinical significance within the calvarium. His potassium was low at 3. His bilirubin was elevated to 2.8 which is consistent with his chronic elevation. Labs were otherwise unremarkable. Code status was discussed with the patient. He is a full code. His significant other, Anika Hernández, is his surrogate medical decision maker.     * No surgery found *      Hospital Course:   The pt was placed in observation for hallucinations, AMS, and fall. Pt was recently prescribed Norco. He reports he was taking it 4 times per day. Norco discontinued on admit. AMS and hallucinations resolved. CT head showed abnormalities- possible lytic lesions. MRI brain showed nothing acute- no lytic lesions. Pt was AAOX3. No further hallucinations. Pt instructed to discontinue all sedating medications due to pt's hx of cirrhosis and alcohol use. Pt extensively  "counseled on alcohol cessation. Pt verbalized understanding.  Home health arranged. Pt needs to follow up with Psychiatry if hallucinations persist.           Final Active Diagnoses:    Diagnosis Date Noted POA    PRINCIPAL PROBLEM:  Encephalopathy, toxic [G92] 10/10/2018 Yes    Hallucinations [R44.3] 10/09/2018 Yes    Hypokalemia [E87.6] 10/09/2018 Yes    Lytic lesion of bone on x-ray [M89.9] 10/09/2018 Yes    Alcohol abuse [F10.10] 02/26/2018 Yes    Alcoholic cirrhosis of liver without ascites [K70.30] 12/21/2017 Yes      Problems Resolved During this Admission:       Discharged Condition: good    Disposition: Home-Health Care [facility]    Follow Up:  Follow-up Information     Joanie Simms MD In 3 days.    Specialty:  Internal Medicine  Contact information:  [address]  [place] [address] [phone]             PSYCHIATRY In 1 week.    Specialty:  Psychiatry  Contact information:  [address] [facility] [address] [phone]               Patient Instructions:      WALKER FOR HOME USE     Order Specific Question Answer Comments   Type of Walker: Rollator    With wheels? Yes    Height: 5' 9" (1.753 m)    Weight: 90 kg (198 lb 6.6 oz)    Length of need (1-99 months): 99    Please check all that apply: Patient's condition impairs ambulation.      Ambulatory consult to Psychiatry   Referral Priority: Routine Referral Type: Psychiatric   Referral Reason: Specialty Services Required   Requested Specialty: Psychiatry   Number of Visits Requested: 1     Diet Cardiac     Activity as tolerated       Significant Diagnostic Studies:  Imaging Results          CT Head Without Contrast (Final result)  Result time 10/09/18 07:13:58    Final result by Elver Carter MD (10/09/18 07:13:58)                 Impression:      Chronic microvascular ischemic changes.  Severe vascular calcifications.    Stable findings within the calvarium with heterogeneous marrow signal and multiple lytic " abnormalities of unknown clinical significance.  Myomatous process is not excluded.      Electronically signed by: Elver Carter MD  Date:    10/09/2018  Time:    07:13             Narrative:    EXAMINATION:  CT HEAD WITHOUT CONTRAST    CLINICAL HISTORY:  Confusion/delirium, altered LOC, unexplained;    TECHNIQUE:  Low dose axial CT images obtained throughout the head without intravenous contrast. Sagittal and coronal reconstructions were performed.  All CT scans at this facility use dose modulation, iterative reconstruction and/or weight based dosing when appropriate to reduce radiation dose to as low as reasonably achievable.    COMPARISON:  9/23/16.    FINDINGS:  Intracranial compartment:    The brain parenchyma demonstrates areas of decreased attenuation with mild to moderate periventricular white matter consistent with chronic microvascular ischemic changes..  No parenchymal mass, hemorrhage, edema or major vascular distribution infarct.  Vascular calcifications are noted.    Mild prominence of the sulci and ventricles are consistent with age-related involutional changes.    No extra-axial blood or fluid collections.    Skull/extracranial contents (limited evaluation):Stable findings within the calvarium with heterogeneous marrow signal and multiple lytic abnormalities of unknown clinical significance.  Myomatous process is not excluded.  No fracture. Mild mucosal thickening within the paranasal sinuses.  Mastoid air cells and paranasal sinuses are essentially clear.                                Pending Diagnostic Studies:     None         Medications:  Reconciled Home Medications:      Medication List      START taking these medications    folic acid 1 MG tablet  Commonly known as:  FOLVITE  Take 1 tablet (1 mg total) by mouth once daily.     multivitamin tablet  Commonly known as:  THERAGRAN  Take 1 tablet by mouth once daily.     thiamine 100 MG tablet  Take 1 tablet (100 mg total) by mouth once daily.         CONTINUE taking these medications    cholecalciferol (vitamin D3) 2,000 unit Cap  Commonly known as:  VITAMIN D3  Take 2,000 Units by mouth.     pantoprazole 40 MG tablet  Commonly known as:  PROTONIX  Take 1 tablet (40 mg total) by mouth 2 (two) times daily.        STOP taking these medications    HYDROcodone-acetaminophen  mg per tablet  Commonly known as:  NORCO            Indwelling Lines/Drains at time of discharge:   Lines/Drains/Airways          None          Time spent on the discharge of patient: 44 minutes  Patient was seen and examined on the date of discharge and determined to be suitable for discharge.         Mojgan Champagne NP  Department of Hospital Medicine  [facility]    No

## 2019-09-23 NOTE — PROGRESS NOTE ADULT - ATTENDING COMMENTS
Keratitis pt is medically stable for d/c to [facility] today.  transport set up.   remainder of plan per resident note.  discharge time 45 mins.

## 2021-03-26 NOTE — ED ADULT NURSE NOTE - PT NEEDS ASSIST
CXR Results? Any treatment?     No Known Allergies
Patients wife called to check on the status of message, informed that Lizzy Mariano will address today.
no

## 2021-03-29 NOTE — PROGRESS NOTE ADULT - PROBLEM SELECTOR PLAN 3
Care Management Interventions  PCP Verified by CM: Yes(Bradley Barrera MD)  Mode of Transport at Discharge: (family)  Transition of Care Consult (CM Consult): Discharge Planning  Current Support Network: Lives Alone( [address])  Confirm Follow Up Transport: Family  The Patient and/or Patient Representative was Provided with a Choice of Provider and Agrees with the Discharge Plan?: Yes  Freedom of Choice List was Provided with Basic Dialogue that Supports the Patient's Individualized Plan of Care/Goals, Treatment Preferences and Shares the Quality Data Associated with the Providers?: Yes  Discharge Location  Discharge Placement: Home  Visited with pt regarding plans for discharge, pt is a 79 y/o F who lives at [facility] in the [address] [place] [address]. She has a Hx of HTN, CAD, A-Fib and Stroke, she is here for a syncope episode. She lives alone and is very inde, has 2 children, with her dtr/Antonietta #[phone], who is at bedside and is pt's POA. States has documention on file and there is. Pt also states she wants to be a DNR, but documentation on file. She is discussing with dtr. She has no concerns at this time. MOON letter complete, CM to follow. -UCx neg  - UA is pos for LE so will tx with ceftriaxone for now.  - will treat with 3 days of ceftriaxone.

## 2021-08-28 NOTE — ED ADULT NURSE NOTE - CCCP TRG CHIEF CMPLNT
pain, thigh Detail Level: Detailed General Sunscreen Counseling: I recommended a broad spectrum sunscreen with a SPF of 30 or higher.  I explained that SPF 30 sunscreens block approximately 97 percent of the sun's harmful rays.  Sunscreens should be applied at least 15 minutes prior to expected sun exposure and then every 2 hours after that as long as sun exposure continues. If swimming or exercising sunscreen should be reapplied every 45 minutes to an hour after getting wet or sweating.  One ounce, or the equivalent of a shot glass full of sunscreen, is adequate to protect the skin not covered by a bathing suit. I also recommended a lip balm with a sunscreen as well. Sun protective clothing can be used in lieu of sunscreen but must be worn the entire time you are exposed to the sun's rays.

## 2022-02-10 NOTE — DIETITIAN INITIAL EVALUATION ADULT. - DOB: +DATEOFBIRTH
[Appropriately responsive] : appropriately responsive [Alert] : alert [No Acute Distress] : no acute distress [Oriented x3] : oriented x3 [No Discharge] : no discharge [No Masses] : no breast masses were palpable [No Lesions] : no lesions  [Labia Majora] : normal [Labia Minora] : normal [Pink Rugae] : pink rugae [No Bleeding] : There was no active vaginal bleeding [Normal] : normal [Normal Position] : in a normal position [Uterine Adnexae] : normal Statement Selected

## 2022-03-17 NOTE — BEHAVIORAL HEALTH ASSESSMENT NOTE - NS ED BHA MSE GENERAL APPEARANCE
Yearly well exam  Continue excellent skin care for eczema  Call insurance for allergist coverage   Will order Elidel and hydrocortisone valerate per Mom's request  Call with concerns
Well developed

## 2023-01-20 NOTE — DIETITIAN INITIAL EVALUATION ADULT. - WEIGHT IN LBS
Patient : Sumi Roque Age: 30 year old Sex: female   MRN: [id number] Encounter Date: 1/20/2023    History     Chief Complaint   Patient presents with   • Rib Pain   • Chest Pain Adult       HPI    Sumi Roque is a 30 year old presenting to the emergency department with chest pain under her left breast.  Patient reports this pain for 2 days.  She is recovering from COVID and does admit that she has been coughing a lot.  She states pain is a 9/10 and hurts more when she takes a deep breath;  She does admit touching the area is very painful.  She denies a fever, still does have somewhat of a cough.  She denies sore throat or runny nose.  No vomiting/ diarrhea currently.    This patient has been evaluated in the emergency room 7 times in the last 11 days.  Patient does have a history of anxiety and did have COVID diagnosed on January 9th.    We did discuss that her heart rate is in the 70s her oxygen level is 100% on exam.  She does not appear in acute distress.    Urgent care note from the day before was reviewed.    Allergies   Allergen Reactions   • Iron Sucrose SHORTNESS OF BREATH   • Compazine ANXIETY   • Reglan ANXIETY   • Skin Adhesives RASH     Tegaderm specifically caused rash (2/21/21)        No current facility-administered medications for this encounter.     Current Outpatient Medications   Medication Sig   • cholecalciferol 25 mcg (1,000 units) tablet Take 1 tablet by mouth daily.   • Semaglutide-Weight Management (Wegovy) 0.25 MG/0.5ML Solution Auto-injector Inject 0.25 mg into the skin every 7 days.   • Semaglutide-Weight Management (Wegovy) 0.5 MG/0.5ML Solution Auto-injector Inject 0.5 mg into the skin every 7 days.   • Semaglutide-Weight Management (Wegovy) 1 MG/0.5ML Solution Auto-injector Inject 1 mg into the skin every 7 days.   • Semaglutide-Weight Management (Wegovy) 1.7 MG/0.75ML Solution Auto-injector Inject 1.7 mg into the skin every 7 days.   • Semaglutide-Weight Management (Wegovy) 2.4  MG/0.75ML Solution Auto-injector Inject 2.4 mg into the skin every 7 days.   • Viibryd 10 MG tablet Take 1 tablet by mouth daily.   • clonazePAM (KlonoPIN) 0.5 MG tablet Take 1.5 tablets by mouth in the morning and 1.5 tablets at noon and 1.5 tablets in the evening.   • albuterol 108 (90 Base) MCG/ACT inhaler    • benzonatate (TESSALON PERLES) 100 MG capsule Take 2 capsules by mouth 3 times daily as needed for Cough.   • ondansetron (ZOFRAN ODT) 8 MG disintegrating tablet Place 1 tablet onto the tongue every 8 hours as needed for Nausea.   • rosuvastatin (CRESTOR) 5 MG tablet Take 1 tablet by mouth nightly.   • ALPRAZolam (XANAX) 1 MG tablet Take 1 tablet by mouth daily as needed for Anxiety.   • mirtazapine (REMERON) 7.5 MG tablet 2 mg at bedtime. Continue tapering off by dropping down to 0.5 mg every week-month until OFF   • Setlakin 0.15-0.03 MG per tablet Take 1 tablet by mouth daily.   • IRON-VIT C-VIT B12-FOLIC ACID 28-60-0.008-0.4 MG Cap Take 1 capsule by mouth every other day.   • propRANolol (INDERAL) 10 MG tablet Take 1 tablet by mouth in the morning and 1 tablet at noon and 1 tablet in the evening.   • vitamin B-12 (CYANOCOBALAMIN) 1000 MCG tablet Take 1,000 mcg by mouth 3 days a week. OTC   • Multiple Vitamins-Minerals (EMERGEN-C IMMUNE PO) Take 1 Package by mouth nightly.   • acetaminophen (TYLENOL) 500 MG tablet Take 1,000 mg by mouth every 6 hours as needed for Pain.   • naproxen sodium (ALEVE) 220 MG tablet Take 440 mg by mouth daily as needed. 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 Car Benign Esophagus & Small Bowel tissue   • Flexible sigmoidoscopy diagnostic include specimens  07/08/2021    Dr. Car, Sm Internal Hemorrhoid, otherwise normal   • Pap liquid based w/screening  07/06/2010    Mild dysplasia       Family History   Problem Relation Age of Onset   • Other Mother         adopted   • Anxiety disorder Mother    • Other Father         adopted    • Suicide Father    • Urticaria Daughter    • Urticaria Son    • Cancer, Breast Neg Hx    • Cancer, Ovarian Neg Hx    • Cancer, Colon Neg Hx        Social History     Tobacco Use   • Smoking status: Never   • Smokeless tobacco: Never   Vaping Use   • Vaping Use: never used   Substance Use Topics   • Alcohol use: Never     Alcohol/week: 0.0 standard drinks   • Drug use: No       Review of Systems     Review of Systems   Constitutional: Negative for chills and fever.   HENT: Negative for congestion, rhinorrhea and sore throat.    Respiratory: Positive for cough. Negative for shortness of breath.    Cardiovascular: Positive for chest pain.   Gastrointestinal: Negative for abdominal pain, constipation, diarrhea, nausea and vomiting.   Genitourinary: Negative for dysuria.       Physical Exam     ED Triage Vitals [01/20/23 1236]   ED Triage Vitals Group      Temp 97.9 °F (36.6 °C)      Heart Rate 91      Resp 18      /84      SpO2 99 %      EtCO2 mmHg       Height 5' 2\" (1.575 m)      Weight 164 lb (74.4 kg)      Weight Scale Used Scale in bed      BMI (Calculated) 30      IBW/kg (Calculated) 50.1       Physical Exam  Constitutional:       General: She is not in acute distress.     Appearance: She is not ill-appearing, toxic-appearing or diaphoretic.   HENT:      Head:  Normocephalic.   Eyes:      Extraocular Movements: Extraocular movements intact.      Pupils: Pupils are equal, round, and reactive to light.   Cardiovascular:      Rate and Rhythm: Normal rate and regular rhythm.      Heart sounds: Normal heart sounds.    No diastolic murmur is present.  Pulmonary:      Effort: Pulmonary effort is normal.      Breath sounds: Normal breath sounds.   Chest:      Chest wall: Tenderness present.      Comments: Patient demonstrates pinpoint tenderness in the ribs just under her left breast possibly rib 10 along the lateral side.  No rash no bruising noted.  Pain is reproducible with touching this area.  No palpable pain on the anterior chest lateral chest posterior chest at this time  Musculoskeletal:         General: Normal range of motion.      Cervical back: Normal range of motion.   Skin:     General: Skin is warm and dry.   Neurological:      General: No focal deficit present.      Mental Status: She is alert and oriented to person, place, and time.   Psychiatric:         Mood and Affect: Mood normal.         Behavior: Behavior normal.           Procedures     Procedures    Lab Results     No results found for this visit on 01/20/23.    EKG   Normal sinus rhythm rightward axis borderline EKG when compared with EKG from January 17, 2023 no significant change was found.  Ventricular rate 80 beats per minute QTC is 402,   EKG independently reviewed by ED physician. Cardiology interpretation to follow.    Radiology Results     Imaging Results    None         ED Medications     ED Medication Orders (From admission, onward)    Ordered Start     Status Ordering Provider    01/20/23 1331 01/20/23 1331  ibuprofen (MOTRIN) tablet 600 mg  ONCE         Last MAR action: Given CARIDAD BARRAGAN          ED Course     Vitals:    01/20/23 1236 01/20/23 1300 01/20/23 1330   BP: 123/84 115/75 119/83   BP Location: RUE - Right upper extremity     Patient Position: Semi-Basilio's     Pulse:  91 79 79   Resp: 18     Temp: 97.9 °F (36.6 °C)     TempSrc: Temporal     SpO2: 99% 100% 100%   Weight: 74.4 kg (164 lb)     Height: 5' 2\" (1.575 m)     LMP: 11/17/2022            Consults                  Medical Decision Making                               30-year-old presenting to the emergency room complaining of left-sided rib pain 11 days after her initial diagnosis of COVID.    The patient has been seen numerous times between ER / urgent care during her ill.    She states she started to note this pain yesterday.  On her last ER visit on the 17th she was noted to have elevated liver enzymes, she is very anxious about this and wants to know if her liver is in this area.  We discussed that she does have a small lobe of the liver on the left, majority of the liver is on the right side.  We also discussed that she is tender to palpation of the ribs; she states the pain worsens with movement.  I do feel most likely this is musculoskeletal pain.  She agrees to a lidocaine patch.  She is requesting ibuprofen type medications.  She understands she will avoid Tylenol type products until she follows up with her family doctor.  We did discuss she should have her labs rechecked within the week to ensure that her liver enzymes are going back to normal.    I did review her medical history and see that she did have a chest x-ray on the 17th that was stable without evidence pneumonia at that time.  Patient also had a normal D-dimer at that time.  Without a fever, worsening cough, and with stable vital signs I do not feel she requires any additional imaging.  Her EKG is stable.    She does understand to return if she has a fever, difficulty breathing, or worsening symptoms.    MDM done in ED Course    Does the Patient have sepsis: NO     Critical Care       No Critical Care        Disposition       Clinical Impression and Diagnosis  8:29 PM       ED Diagnosis     Diagnosis Comment Associated Orders       Final diagnosis     148 Rib pain -- --          The patient was provided with a recommendation to follow up with a primary care provider and obtain reassessment of his/her blood pressure within three months.    Follow Up:  Nora Caraballo MD  [address] [facility] [address] [phone]    Schedule an appointment as soon as possible for a visit   Schedule an appointment for follow-up from your recent ER visits.  You should have your liver enzymes rechecked at least by next week          Summary of your Discharge Medications      You have not been prescribed any medications.         Pt is discharged to home/self care in stable condition.                Discharge after Treatment 1/20/2023  1:32 PM  There is no comment                   Brianne Harper PA-C  01/20/23 2029     130

## 2024-11-09 NOTE — DISCHARGE NOTE ADULT - DISCHARGE WEIGHT
standing no hearing difficulty/no ear pain/no tinnitus/no vertigo/no sinus symptoms/no nasal congestion/no nasal discharge/no nose bleeds/no gum bleeding/no dry mouth/no throat pain/no dysphagia

## 2025-03-05 NOTE — ED ADULT TRIAGE NOTE - NS ED NURSE DIRECT TO ROOM YN
Second attempt made to contact patient to schedule 2025 yearly pharmacist appointment to discuss medications for Diabetes Management Program.    No answer. Left VM.     [employer] message sent to patient.    No further patient outreach planned at this time.    Jarrell Lincoln [facility]  Clinical Pharmacy   Department, toll free: [phone] Option #3        For Pharmacy Admin Tracking Only    Program: [facility]  CPA in place:  No  Gap Closed?: No   Time Spent (min): 5      No

## 2025-03-28 NOTE — BEHAVIORAL HEALTH ASSESSMENT NOTE - PATIENT'S CHIEF COMPLAINT
I can't kill myself, I believe in God Rash to arms.  Initially noted yesterday on volar aspect of left forearm.  Awoke today with lesions on volar aspect of right forearm.  no itch.  No pain.  No fever.  No URI symptoms.   No bleeding problems: Denies nosebleeds, gum bleeding.  Immunizations up-to-date.  Physical examination: Nontoxic-appearing, no acute distress, small round petechial lesions measuring 3 to 4 mm and groups of 4 along the volar aspect of right forearm.  Small, round petechial lesion measuring 3 to 4 mm in the group of 2 on left forearm.  Lesions appear to be symmetrical and manmade (like imprints from a beaded necklace or bracelet.  A/P:  petechiae.  advise immediate return for re-evaluation if rash spreading.